# Patient Record
Sex: FEMALE | Race: WHITE | NOT HISPANIC OR LATINO | Employment: FULL TIME | URBAN - METROPOLITAN AREA
[De-identification: names, ages, dates, MRNs, and addresses within clinical notes are randomized per-mention and may not be internally consistent; named-entity substitution may affect disease eponyms.]

---

## 2019-12-02 LAB — PAP SMEAR - HIM PATIENT REPORTED: NEGATIVE

## 2020-01-22 ENCOUNTER — OFFICE VISIT (OUTPATIENT)
Dept: FAMILY MEDICINE | Facility: CLINIC | Age: 40
End: 2020-01-22
Payer: COMMERCIAL

## 2020-01-22 VITALS
DIASTOLIC BLOOD PRESSURE: 65 MMHG | HEART RATE: 76 BPM | WEIGHT: 118.25 LBS | HEIGHT: 67 IN | OXYGEN SATURATION: 98 % | BODY MASS INDEX: 18.56 KG/M2 | SYSTOLIC BLOOD PRESSURE: 105 MMHG | RESPIRATION RATE: 15 BRPM | TEMPERATURE: 98.3 F

## 2020-01-22 DIAGNOSIS — K13.70 MOUTH LESION: Primary | ICD-10-CM

## 2020-01-22 PROBLEM — D18.00 CAVERNOUS HEMANGIOMA: Status: ACTIVE | Noted: 2020-01-14

## 2020-01-22 PROBLEM — E03.9 HYPOTHYROIDISM: Status: ACTIVE | Noted: 2020-01-14

## 2020-01-22 PROBLEM — J30.2 SEASONAL ALLERGIES: Status: ACTIVE | Noted: 2020-01-14

## 2020-01-22 RX ORDER — LEVOTHYROXINE SODIUM 100 UG/1
86 TABLET ORAL DAILY
COMMUNITY
Start: 2019-10-23

## 2020-01-22 RX ORDER — LORAZEPAM 0.5 MG/1
0.5 TABLET ORAL PRN
COMMUNITY
Start: 2019-12-02 | End: 2022-08-19

## 2020-01-22 RX ORDER — AMOXICILLIN 875 MG
875 TABLET ORAL 2 TIMES DAILY
Qty: 14 TABLET | Refills: 0 | Status: SHIPPED | OUTPATIENT
Start: 2020-01-22 | End: 2022-08-19

## 2020-01-22 RX ORDER — CETIRIZINE HYDROCHLORIDE 10 MG/1
10 TABLET ORAL PRN
COMMUNITY
Start: 2019-12-02

## 2020-01-22 SDOH — HEALTH STABILITY: MENTAL HEALTH: HOW MANY STANDARD DRINKS CONTAINING ALCOHOL DO YOU HAVE ON A TYPICAL DAY?: 1 OR 2

## 2020-01-22 SDOH — HEALTH STABILITY: MENTAL HEALTH: HOW OFTEN DO YOU HAVE A DRINK CONTAINING ALCOHOL?: 4 OR MORE TIMES A WEEK

## 2020-01-22 ASSESSMENT — MIFFLIN-ST. JEOR: SCORE: 1256.62

## 2020-01-22 NOTE — PROGRESS NOTES
"SUBJECTIVE:   Lindsay Carranza is a 39 year old female who presents to clinic today to discuss the following problem(s).    Patient is new to [place] and needs to establish with primary care with a wellness visit, but prefers to address only acute issue as noted below    (K13.70) Mouth lesion  (primary encounter diagnosis)  - her cat \"Tasha\" landed on her face and scratched her upper and lower lip on 12/29   - the upper lip is healing well without issues  - the lower lip was not healing well so she went to [facility] for assessment  - they did not see evidence of infection, did not suspect HSV  - they gave her magic mouthwash for pain which she never actually used, preferring to continue using listerine  - since then, patient reports the wound got better, then developed a sort of pustule on the inner lip which then resolved on its own, but then returned again  - reports pustule is very painful to touch  - denies further symptoms as noted in ROS below      ROS:   CONSTITUTIONAL: NEGATIVE for chills, fatigue, fever, sweats  ENT/MOUTH: Mouth lesion as noted above. NEGATIVE for nasal congestion, postnasal drainage and rhinorrhea  RESP: NEGATIVE for cough, SOB/dyspnea and wheezing  CV: NEGATIVE for chest pain/chest pressure, dyspnea on exertion  GI: NEGATIVE for abdominal pain, diarrhea, dysphagia and nausea  MUSCULOSKELETAL: NEGATIVE for arthralgias, cyanosis, or edema  INTEGUMENTARY/SKIN: Persistent intraoral mouth lesion as noted above  HEME/ALLERGY/IMMUNE: Does note singular post auricular swollen lymph node on left  PSYCHIATRIC: NEGATIVE    Today's PHQ-2:  PHQ-2 ( 1999 Pfizer) 1/22/2020   Q1: Little interest or pleasure in doing things 0   Q2: Feeling down, depressed or hopeless 0   PHQ-2 Score 0       Past Medical History:   Diagnosis Date     Cavernous hemangioma of brain (H)      Hypothyroid      History reviewed. No pertinent surgical history.  History reviewed. No pertinent family history.  Social History     Tobacco " "Use     Smoking status: Never Smoker     Smokeless tobacco: Never Used   Substance Use Topics     Alcohol use: Yes     Frequency: 4 or more times a week     Drinks per session: 1 or 2     Drug use: None     Social History     Social History Narrative     Not on file       Current Outpatient Medications   Medication     amoxicillin (AMOXIL) 875 MG tablet     cetirizine (ZYRTEC) 10 MG tablet     levothyroxine (SYNTHROID/LEVOTHROID) 100 MCG tablet     LORazepam (ATIVAN) 0.5 MG tablet     No current facility-administered medications for this visit.      I have reviewed the patient's past medical, surgical, family, and social history.     OBJECTIVE:   /65 (BP Location: Right arm, Patient Position: Sitting, Cuff Size: Adult Regular)   Pulse 76   Temp 98.3  F (36.8  C) (Oral)   Resp 15   Ht 1.706 m (5' 7.17\")   Wt 53.6 kg (118 lb 4 oz)   LMP 12/30/2019   SpO2 98%   BMI 18.43 kg/m      Constitutional: well-appearing, appears stated age  Eyes: conjunctivae without erythema, sclera anicteric.   Mouth: single 3-4mm intraoral pustule on lower lip, tender to palpation  Cardiac: regular rate and rhythm, normal S1/S2, no murmur/rubs/gallops  Respiratory: lungs clear to auscultation bilaterally, normal work of breathing, no wheezes/crackles  Skin: Well healing laceration at upper lip  Psych: affect is full and appropriate, speech is fluent and non-pressured    ASSESSMENT AND PLAN:     Lindsay was seen today for mouth/lip problem.    Diagnoses and all orders for this visit:    Mouth lesion  -     amoxicillin (AMOXIL) 875 MG tablet; Take 1 tablet (875 mg) by mouth 2 times daily    Lesion to upper lip appears to be healing well. Suspect persistent infection at site of intraoral laceration given duration of symptoms. No concern for rabies. Considered possibility of MRSA, but presentation is less that of an abscess and more of a pustule at lesion site. Also, I would think the waxing and waning of symptoms peculiar for an MRSA " infection. However, if symptoms fail to improve with amoxicillin as prescribed, I would be more inclined to cut and drain the pustule for culture/HSV testing.   Discussed development of worsening symptoms such as multiplying pustules or worsening pain with fever or developing nausea as reason to seek more immediate medical follow up.   Will plan for annual wellness exam at future date.      John Vela MD  [facility]  01/22/2020, 9:17 AM

## 2020-01-22 NOTE — LETTER
[employer] Customer Service  [facility]  [address], [address]  [place] [address]  Fax: [phone]  Phone: [phone]      2020      Lindsay Carranza  [address]  [address]  [place] [address]        Dear Lindsay,    Thank you for your interest in becoming a [employer] user!    Your access code is: [email or web address]  Expires: 3/22/2020  8:42 AM     Please access the [employer] website at [email or web address].  Below the ID and password fields, select the  Sign Up Now  as New User.  You will be prompted to enter the access code listed above as well as additional personal information.  Please follow the directions carefully when creating your username and password.    If you allow your access code to , or if you have any questions please call a Heartbeat Representative at [phone] during normal clinic hours.     Sincerely,      [facility] Customer Service  [facility]

## 2020-01-22 NOTE — NURSING NOTE
"39 year old  Chief Complaint   Patient presents with     Mouth/Lip Problem     cat scratched lip 12/29, not healing well        Blood pressure 105/65, pulse 76, temperature 98.3  F (36.8  C), temperature source Oral, resp. rate 15, height 1.706 m (5' 7.17\"), weight 53.6 kg (118 lb 4 oz), last menstrual period 12/30/2019, SpO2 98 %. Body mass index is 18.43 kg/m .  Patient Active Problem List   Diagnosis     Cavernous hemangioma     Hypothyroidism     Seasonal allergies     Abnormal Pap smear of cervix       Wt Readings from Last 2 Encounters:   01/22/20 53.6 kg (118 lb 4 oz)     BP Readings from Last 3 Encounters:   01/22/20 105/65         Current Outpatient Medications   Medication     cetirizine (ZYRTEC) 10 MG tablet     levothyroxine (SYNTHROID/LEVOTHROID) 100 MCG tablet     LORazepam (ATIVAN) 0.5 MG tablet     No current facility-administered medications for this visit.        Social History     Tobacco Use     Smoking status: Never Smoker     Smokeless tobacco: Never Used   Substance Use Topics     Alcohol use: Yes     Frequency: 4 or more times a week     Drinks per session: 1 or 2     Drug use: None       Health Maintenance Due   Topic Date Due     PREVENTIVE CARE VISIT  1982     HIV SCREENING  06/03/1997     PAP  06/03/2003       No results found for: PAP      January 22, 2020 9:00 AM    "

## 2020-03-11 ENCOUNTER — HEALTH MAINTENANCE LETTER (OUTPATIENT)
Age: 40
End: 2020-03-11

## 2020-04-16 ENCOUNTER — VIRTUAL VISIT (OUTPATIENT)
Dept: FAMILY MEDICINE | Facility: CLINIC | Age: 40
End: 2020-04-16
Payer: COMMERCIAL

## 2020-04-16 DIAGNOSIS — G62.9 NEUROPATHY: ICD-10-CM

## 2020-04-16 DIAGNOSIS — D49.6 BRAIN TUMOR (H): ICD-10-CM

## 2020-04-16 DIAGNOSIS — Z09 HOSPITAL DISCHARGE FOLLOW-UP: Primary | ICD-10-CM

## 2020-04-16 RX ORDER — PREGABALIN 25 MG/1
25 CAPSULE ORAL 2 TIMES DAILY
COMMUNITY
End: 2020-04-16

## 2020-04-16 RX ORDER — PREGABALIN 50 MG/1
50 CAPSULE ORAL DAILY
Qty: 30 CAPSULE | Refills: 3 | Status: SHIPPED | OUTPATIENT
Start: 2020-04-16 | End: 2022-08-19

## 2020-04-16 RX ORDER — PREGABALIN 25 MG/1
25 CAPSULE ORAL 2 TIMES DAILY
Qty: 60 CAPSULE | Refills: 3 | Status: SHIPPED | OUTPATIENT
Start: 2020-04-16 | End: 2022-08-19

## 2020-04-16 RX ORDER — PREGABALIN 50 MG/1
50 CAPSULE ORAL DAILY
COMMUNITY
End: 2020-04-16

## 2020-04-16 ASSESSMENT — PAIN SCALES - GENERAL: PAINLEVEL: NO PAIN (0)

## 2020-04-16 NOTE — NURSING NOTE
39 year old  Chief Complaint   Patient presents with     Surgical Followup     discuss about a new medication and nees to file for a disability form.       There were no vitals taken for this visit. There is no height or weight on file to calculate BMI.  Patient Active Problem List   Diagnosis     Cavernous hemangioma     Hypothyroidism     Seasonal allergies     Abnormal Pap smear of cervix       Wt Readings from Last 2 Encounters:   01/22/20 53.6 kg (118 lb 4 oz)     BP Readings from Last 3 Encounters:   01/22/20 105/65         Current Outpatient Medications   Medication     cetirizine (ZYRTEC) 10 MG tablet     levothyroxine (SYNTHROID/LEVOTHROID) 100 MCG tablet     pregabalin (LYRICA) 25 MG capsule     pregabalin (LYRICA) 50 MG capsule     amoxicillin (AMOXIL) 875 MG tablet     LORazepam (ATIVAN) 0.5 MG tablet     No current facility-administered medications for this visit.        Social History     Tobacco Use     Smoking status: Never Smoker     Smokeless tobacco: Never Used   Substance Use Topics     Alcohol use: Yes     Frequency: 4 or more times a week     Drinks per session: 1 or 2     Drug use: None       Health Maintenance Due   Topic Date Due     PREVENTIVE CARE VISIT  1982     HIV SCREENING  06/03/1997     PAP  06/03/2003       No results found for: PAP      Lakshmi Tracy CMA, [facility]  April 16, 2020 9:34 AM

## 2020-04-16 NOTE — PROGRESS NOTES
"Lindsay Carranza is a 39 year old female who is being evaluated via a billable telephone visit.      The patient has been notified of following:     \"This telephone visit will be conducted via a call between you and your physician/provider. We have found that certain health care needs can be provided without the need for a physical exam.  This service lets us provide the care you need with a short phone conversation.  If a prescription is necessary we can send it directly to your pharmacy.  If lab work is needed we can place an order for that and you can then stop by our lab to have the test done at a later time.    Telephone visits are billed at different rates depending on your insurance coverage. During this emergency period, for some insurers they may be billed the same as an in-person visit.  Please reach out to your insurance provider with any questions.    If during the course of the call the physician/provider feels a telephone visit is not appropriate, you will not be charged for this service.\"    Patient has given verbal consent for Telephone visit?  Yes    How would you like to obtain your AVS? MyChart    Subjective     Lindsay Carranza is a 39 year old female who presents to clinic today for the following health issues:    PROBLEMS TO ADD ON...  # Hospital Follow Up  Admission Date: 3/12/2020 Discharge Date: 3/19/2020  [facility], Discharge Date 4/9/20    PRINCIPAL DIAGNOSIS  Cerebral Hemorrhage   Brain Tumor    Active Problems:  Dysphagia Following Nontraumatic Intracerebral Hemorrhage  Esotropia Alternating  Hypothyroidism  Ataxia Cerebellar  Dysarthria    Reviewed discharge summary of 3/12 and 4/9    Patient reports she would like to try to taper off of the Pregabalin she was started on inpatient for neuropathy. She denies issues taking medications. She denies concerning side effects.     No recommended follow up labs noted.    Patient continues with outpatient rehab.    Patient " has plan in place to follow up with neurosurgery in two months for repeat imaging and assessment of intracranial lesions.    Disability   Patient is trying to address disability insurance. Insurance is requesting progress notes from treatment with information clarifying work limitations. We don't have any of this as I have not seen patient since recent trauma. Patient was in inpatient rehab for three weeks following hospital discharge, at the [facility]. I can see discharge records from them in Epic. Patient and her partner are wondering how best to get pertinent requested information to the insurance company.     Patient Active Problem List   Diagnosis     Cavernous hemangioma     Hypothyroidism     Seasonal allergies     Abnormal Pap smear of cervix     History reviewed. No pertinent surgical history.    Social History     Tobacco Use     Smoking status: Never Smoker     Smokeless tobacco: Never Used   Substance Use Topics     Alcohol use: Yes     Frequency: 4 or more times a week     Drinks per session: 1 or 2     History reviewed. No pertinent family history.      Current Outpatient Medications   Medication Sig Dispense Refill     cetirizine (ZYRTEC) 10 MG tablet Take 10 mg by mouth as needed       levothyroxine (SYNTHROID/LEVOTHROID) 100 MCG tablet Take 86 mcg by mouth daily Take 100mcg six of every seven days. This averages to 86mcg daily.        pregabalin (LYRICA) 25 MG capsule Take 1 capsule (25 mg) by mouth 2 times daily 60 capsule 3     pregabalin (LYRICA) 50 MG capsule Take 1 capsule (50 mg) by mouth daily 30 capsule 3     amoxicillin (AMOXIL) 875 MG tablet Take 1 tablet (875 mg) by mouth 2 times daily (Patient not taking: Reported on 4/16/2020) 14 tablet 0     LORazepam (ATIVAN) 0.5 MG tablet Take 0.5 mg by mouth as needed       Allergies   Allergen Reactions     Cephalexin Hives     No Clinical Screening - See Comments      Blood thinners due to bleed risk          Reviewed and updated as needed this  visit by Provider  Allergies  Meds  Problems  Med Hx  Surg Hx         Review of Systems   ROS COMP: CONSTITUTIONAL: NEGATIVE for fever, chills, change in weight  EYES: Persistent esotropia and diplopia  ENT/MOUTH: Dysphagia  RESP: NEGATIVE for significant cough or SOB  CV: NEGATIVE for chest pain, palpitations or peripheral edema  GI: NEGATIVE for nausea, abdominal pain, heartburn, or change in bowel habits  : NEGATIVE for frequency, dysuria, or hematuria  MUSCULOSKELETAL: Ataxia, ataxic dysarthria  NEURO: Peripheral neuropathy   HEME: NEGATIVE for bleeding problems  PSYCHIATRIC: NEGATIVE for changes in mood or affect    Objective   Reported vitals:  There were no vitals taken for this visit.   Neuro: speech is audibly slurred but wholly intelligible  PSYCH: Alert; coherent speech, normal   rate and volume, able to articulate logical thoughts, able   to abstract reason, no tangential thoughts, no hallucinations   or delusions  Her affect is normal and pleasant  RESP: No cough, no audible wheezing, able to talk in full sentences  Remainder of exam unable to be completed due to telephone visits      Assessment/Plan:  Lindsay was seen today for surgical followup.    Diagnoses and all orders for this visit:    Hospital discharge follow-up    Neuropathy  -     pregabalin (LYRICA) 25 MG capsule; Take 1 capsule (25 mg) by mouth 2 times daily  -     pregabalin (LYRICA) 50 MG capsule; Take 1 capsule (50 mg) by mouth daily    Brain tumor (H)    Reviewed discharge summaries and recommendations. Discussed ongoing use of pregabalin. I will refill full Rx today although patient reports she is highly motivated to taper off this medication. If she does, that would be fine, but if she continues to need this medication I am comfortable continuing the medication. Will continue to assist with physical and occupational therapy and recovery as needed.     Having not seen the patient since prior to the brain hemorrhage, I cannot speak  to her disability personally. I have reviewed notes from [facility]. I have advised patient and her partner to contact the [facility] at [place] to see if they can fax requested information to her disability insurance company as this seems the easiest way to proceed. They plan to to this. I will also route this note to JUANITO Guzmán for her information as she may have insight into how best to facilitate patient's insurance claim.     Phone call duration:  24 minutes    John Vela MD  11:28 AM, April 16, 2020

## 2021-01-04 ENCOUNTER — HEALTH MAINTENANCE LETTER (OUTPATIENT)
Age: 41
End: 2021-01-04

## 2021-04-25 ENCOUNTER — HEALTH MAINTENANCE LETTER (OUTPATIENT)
Age: 41
End: 2021-04-25

## 2021-10-10 ENCOUNTER — HEALTH MAINTENANCE LETTER (OUTPATIENT)
Age: 41
End: 2021-10-10

## 2021-11-01 ENCOUNTER — NURSE TRIAGE (OUTPATIENT)
Dept: FAMILY MEDICINE | Facility: CLINIC | Age: 41
End: 2021-11-01

## 2021-11-01 NOTE — TELEPHONE ENCOUNTER
Who is calling? Ramez  Reason for Call:Patient is covid positive - spouse wants call back to know what to do if symptoms worsen

## 2021-11-01 NOTE — TELEPHONE ENCOUNTER
Reason for Disposition    [1] COVID-19 diagnosed by positive lab test AND [2] mild symptoms (e.g., cough, fever, others) AND [3] no complications or SOB    Answer Assessment - Initial Assessment Questions  1. COVID-19 DIAGNOSIS: 11/1/21 @  Labs in [place]  2. COVID-19 EXPOSURE: None known.  3. ONSET: 10/31/21  4. WORST SYMPTOM: Body aches, worse in low back.  5. COUGH: Yes, periodically.  6. FEVER: As high as 101, today is 100.  7. RESPIRATORY STATUS: No, shortness of breath, wheezing or chest pressure.  8. BETTER-SAME-WORSE: Getting worse.  9. HIGH RISK DISEASE: Ataxia, Hypothyroidism.  10. PREGNANCY: No  11. OTHER SYMPTOMS: Mild sore throat, body aches, fatigue, chills on and off and headache.    Protocols used: CORONAVIRUS (COVID-19) DIAGNOSED OR TDLTANIXX-O-BU 8.25.2021    Patient tested positive for COVID 19 this morning.  Symptoms are mild and her worst symptom is the body aches.  Her  has tested negative and reviewed quarantine and precautions in the home.  Patient is in agreement and will call if symptoms worsen.  Irish Hsieh RN, BSN  [facility]  11/01/21  10:17 AM

## 2022-05-22 ENCOUNTER — HEALTH MAINTENANCE LETTER (OUTPATIENT)
Age: 42
End: 2022-05-22

## 2022-06-28 ENCOUNTER — OFFICE VISIT (OUTPATIENT)
Dept: FAMILY MEDICINE | Facility: CLINIC | Age: 42
End: 2022-06-28
Payer: COMMERCIAL

## 2022-06-28 VITALS
SYSTOLIC BLOOD PRESSURE: 107 MMHG | TEMPERATURE: 98.3 F | RESPIRATION RATE: 15 BRPM | HEART RATE: 73 BPM | BODY MASS INDEX: 18.86 KG/M2 | OXYGEN SATURATION: 95 % | WEIGHT: 121 LBS | DIASTOLIC BLOOD PRESSURE: 66 MMHG

## 2022-06-28 DIAGNOSIS — S76.111A QUADRICEPS STRAIN, RIGHT, INITIAL ENCOUNTER: Primary | ICD-10-CM

## 2022-06-28 NOTE — PROGRESS NOTES
Medical assistant intake:  Lindsay Carranza is a 42 year old female who presents to [facility] today for Musculoskeletal Problem (Right quad injury 3 weeks ago, not getting better)        ASSESSMENT/PLAN:  RIGHT distal quad strain in a 41 yo with baseline problems with balance and gait. Reassuring physical exam with no worsening with weight bearing, hip and knee extension and flexion. No palpable defects. No redness or warmth. No effusions.   1. Suggested physical therapy at your normal PT location at [facility]  2. OK to continue your baseline activities  3. Let me or Dr. Vela know if unable to progress       --Antonio William MD      SUBJECTIVE:   This is my first time meeting Lindsay.  I have seen her , Ramez in the past.    Lindsay is a 41 yo who has difficulty ambulating due to a CNS bleed a few years ago. She is here due to RIGHT distal quad pain for the past 3 weeks. She noticed it after rising from a squatting position during her exercises.   Pain is with walking and more so in the mornings.     Review Of Systems:    Has otherwise been in usual state of health, e.g.   Cardiovascular: negative  Respiratory: No shortness of breath, dyspnea on exertion, cough, or hemoptysis  Gastrointestinal: negative  Genitourinary: negative    Problem list per EMR:  Patient Active Problem List   Diagnosis     Cavernous hemangioma     Hypothyroidism     Seasonal allergies     Abnormal Pap smear of cervix       Current Outpatient Medications   Medication Sig Dispense Refill     cetirizine (ZYRTEC) 10 MG tablet Take 10 mg by mouth as needed       levothyroxine (SYNTHROID/LEVOTHROID) 100 MCG tablet Take 86 mcg by mouth daily Take 100mcg six of every seven days. This averages to 86mcg daily.        LORazepam (ATIVAN) 0.5 MG tablet Take 0.5 mg by mouth as needed       pregabalin (LYRICA) 25 MG capsule Take 1 capsule (25 mg) by mouth 2 times daily 60 capsule 3     pregabalin (LYRICA) 50 MG capsule Take 1 capsule (50  mg) by mouth daily 30 capsule 3     amoxicillin (AMOXIL) 875 MG tablet Take 1 tablet (875 mg) by mouth 2 times daily (Patient not taking: No sig reported) 14 tablet 0       Allergies   Allergen Reactions     Cephalexin Hives     Other [No Clinical Screening - See Comments]      Blood thinners due to bleed risk           Social:   Works for [employer] as an .      OBJECTIVE    Vitals: /66 (BP Location: Right arm, Patient Position: Sitting, Cuff Size: Adult Regular)   Pulse 73   Temp 98.3  F (36.8  C) (Skin)   Resp 15   Wt 54.9 kg (121 lb)   LMP 06/22/2022   SpO2 95%   BMI 18.86 kg/m    BMI= Body mass index is 18.86 kg/m .  Very pleasant 42-year-old who has a very slightly slurred speech due to her previous brain injury.  She is completely understandable and articulate.  She ambulates with a walker which she has for the past few years.  She is seen to stand fully on her right leg without any difficulty.  Her gait is somewhat antalgic but again this is chronic.  Her right leg is actually stronger than her left leg.    Right leg exam.  Discomfort is at the distal quad tendon as it inserts onto the patella.  She can do an active straight leg raise without any extension lag.  There are no palpable defects.  No masses.  No redness.  No warmth.  She is also able to flex her knee fully.  She can hold a straight leg raise without difficulty.    Her knee has a normal ligamentous exam.  No joint line tenderness.  Patellar mobility is intact and without apprehension.    Hip has full range of motion without discomfort.  Negative straight leg raise.    SEE TOP OF NOTE FOR ASSESSMENT AND PLAN    --Antonio William MD  [facility], Department of Family Medicine and Community Health

## 2022-06-28 NOTE — PATIENT INSTRUCTIONS
ASSESSMENT/PLAN:  RIGHT distal quad strain in a 43 yo with baseline problems with balance and gait. Reassuring physical exam with no worsening with weight bearing, hip and knee extension and flexion. No palpable defects. No redness or warmth. No effusions.   Suggested physical therapy at your normal PT location at [facility] to continue your baseline activities  Let me or Dr. Vela know if unable to progress       --Antonio William MD

## 2022-06-28 NOTE — NURSING NOTE
42 year old  Chief Complaint   Patient presents with     Musculoskeletal Problem     Right quad injury 3 weeks ago, not getting better       Blood pressure 107/66, pulse 73, temperature 98.3  F (36.8  C), temperature source Skin, resp. rate 15, weight 54.9 kg (121 lb), last menstrual period 06/22/2022, SpO2 95 %. Body mass index is 18.86 kg/m .  Patient Active Problem List   Diagnosis     Cavernous hemangioma     Hypothyroidism     Seasonal allergies     Abnormal Pap smear of cervix       Wt Readings from Last 2 Encounters:   06/28/22 54.9 kg (121 lb)   01/22/20 53.6 kg (118 lb 4 oz)     BP Readings from Last 3 Encounters:   06/28/22 107/66   01/22/20 105/65         Current Outpatient Medications   Medication     cetirizine (ZYRTEC) 10 MG tablet     levothyroxine (SYNTHROID/LEVOTHROID) 100 MCG tablet     LORazepam (ATIVAN) 0.5 MG tablet     pregabalin (LYRICA) 25 MG capsule     pregabalin (LYRICA) 50 MG capsule     amoxicillin (AMOXIL) 875 MG tablet     No current facility-administered medications for this visit.       Social History     Tobacco Use     Smoking status: Never Smoker     Smokeless tobacco: Never Used   Substance Use Topics     Alcohol use: Yes       Health Maintenance Due   Topic Date Due     PREVENTIVE CARE VISIT  Never done     ADVANCE CARE PLANNING  Never done     HIV SCREENING  Never done     HEPATITIS C SCREENING  Never done     PAP  12/02/2022       No results found for: PAP      June 28, 2022 7:54 AM

## 2022-08-03 ENCOUNTER — DOCUMENTATION ONLY (OUTPATIENT)
Dept: BEHAVIORAL HEALTH | Facility: CLINIC | Age: 42
End: 2022-08-03

## 2022-08-03 DIAGNOSIS — F43.23 ADJUSTMENT DISORDER WITH MIXED ANXIETY AND DEPRESSED MOOD: Primary | ICD-10-CM

## 2022-08-03 NOTE — PROGRESS NOTES
[facility] rec'd request for psychotherapy services for patient.  Due to patient's complex medical conditions, [facility] referred patient to Health Psychology for services.  [facility] submitted referral.

## 2022-08-19 ENCOUNTER — VIRTUAL VISIT (OUTPATIENT)
Dept: FAMILY MEDICINE | Facility: CLINIC | Age: 42
End: 2022-08-19
Payer: COMMERCIAL

## 2022-08-19 DIAGNOSIS — D18.00 CAVERNOUS HEMANGIOMA: ICD-10-CM

## 2022-08-19 DIAGNOSIS — F41.9 ANXIETY: Primary | ICD-10-CM

## 2022-08-19 PROBLEM — R47.1 DYSARTHRIA: Status: ACTIVE | Noted: 2022-08-19

## 2022-08-19 PROBLEM — H55.00 NYSTAGMUS: Status: ACTIVE | Noted: 2022-08-19

## 2022-08-19 PROBLEM — Z87.440 HISTORY OF RECURRENT UTI (URINARY TRACT INFECTION): Status: ACTIVE | Noted: 2022-06-11

## 2022-08-19 PROBLEM — R27.0 ATAXIA: Status: ACTIVE | Noted: 2022-06-09

## 2022-08-19 PROBLEM — H50.00 ESOTROPIA: Status: ACTIVE | Noted: 2020-03-27

## 2022-08-19 PROBLEM — I69.191 DYSPHAGIA FOLLOWING NONTRAUMATIC INTRACEREBRAL HEMORRHAGE: Status: ACTIVE | Noted: 2020-03-17

## 2022-08-19 PROBLEM — G81.00 FLACCID HEMIPLEGIA AFFECTING NONDOMINANT SIDE (H): Status: ACTIVE | Noted: 2022-06-09

## 2022-08-19 PROBLEM — H53.2 DIPLOPIA: Status: ACTIVE | Noted: 2018-12-28

## 2022-08-19 PROBLEM — Q28.3 CONGENITAL ANOMALY OF CEREBROVASCULAR SYSTEM: Status: ACTIVE | Noted: 2017-08-10

## 2022-08-19 PROBLEM — R44.9 LEFT-SIDED SENSORY DEFICIT PRESENT: Status: ACTIVE | Noted: 2020-03-07

## 2022-08-19 PROBLEM — I61.3: Status: ACTIVE | Noted: 2020-03-06

## 2022-08-19 PROBLEM — J45.909 ASTHMA: Status: ACTIVE | Noted: 2018-12-28

## 2022-08-19 RX ORDER — MEMANTINE HYDROCHLORIDE 7 MG/1
7 CAPSULE, EXTENDED RELEASE ORAL DAILY
COMMUNITY
Start: 2022-06-01

## 2022-08-19 RX ORDER — CITALOPRAM HYDROBROMIDE 10 MG/1
10 TABLET ORAL DAILY
Qty: 30 TABLET | Refills: 1 | Status: SHIPPED | OUTPATIENT
Start: 2022-08-19 | End: 2022-09-19

## 2022-08-19 ASSESSMENT — ANXIETY QUESTIONNAIRES
GAD7 TOTAL SCORE: 6
3. WORRYING TOO MUCH ABOUT DIFFERENT THINGS: SEVERAL DAYS
IF YOU CHECKED OFF ANY PROBLEMS ON THIS QUESTIONNAIRE, HOW DIFFICULT HAVE THESE PROBLEMS MADE IT FOR YOU TO DO YOUR WORK, TAKE CARE OF THINGS AT HOME, OR GET ALONG WITH OTHER PEOPLE: SOMEWHAT DIFFICULT
1. FEELING NERVOUS, ANXIOUS, OR ON EDGE: MORE THAN HALF THE DAYS
GAD7 TOTAL SCORE: 6
2. NOT BEING ABLE TO STOP OR CONTROL WORRYING: SEVERAL DAYS
7. FEELING AFRAID AS IF SOMETHING AWFUL MIGHT HAPPEN: NOT AT ALL
5. BEING SO RESTLESS THAT IT IS HARD TO SIT STILL: NOT AT ALL
4. TROUBLE RELAXING: SEVERAL DAYS
GAD7 TOTAL SCORE: 6
6. BECOMING EASILY ANNOYED OR IRRITABLE: SEVERAL DAYS
8. IF YOU CHECKED OFF ANY PROBLEMS, HOW DIFFICULT HAVE THESE MADE IT FOR YOU TO DO YOUR WORK, TAKE CARE OF THINGS AT HOME, OR GET ALONG WITH OTHER PEOPLE?: SOMEWHAT DIFFICULT
7. FEELING AFRAID AS IF SOMETHING AWFUL MIGHT HAPPEN: NOT AT ALL

## 2022-08-19 ASSESSMENT — PATIENT HEALTH QUESTIONNAIRE - PHQ9
SUM OF ALL RESPONSES TO PHQ QUESTIONS 1-9: 8
SUM OF ALL RESPONSES TO PHQ QUESTIONS 1-9: 8
10. IF YOU CHECKED OFF ANY PROBLEMS, HOW DIFFICULT HAVE THESE PROBLEMS MADE IT FOR YOU TO DO YOUR WORK, TAKE CARE OF THINGS AT HOME, OR GET ALONG WITH OTHER PEOPLE: SOMEWHAT DIFFICULT

## 2022-08-19 NOTE — PROGRESS NOTES
Lindsay is a 42 year old who is being evaluated via a billable video visit.      How would you like to obtain your AVS? MyChart  If the video visit is dropped, the invitation should be resent by: Send to e-mail at: [email or web address]  Will anyone else be joining your video visit? No          Assessment & Plan   Problem List Items Addressed This Visit        Other    Cavernous hemangioma      Other Visit Diagnoses     Anxiety    -  Primary         Reviewed process for starting medication with monitoring for safety and efficacy. Answered questions regarding drug interactions and possible side effects with medications.     25 minutes spent on the date of the encounter doing chart review, history and exam, documentation and further activities as noted.    John Vela MD  [facility]    Subjective   Lindsay is a 42 year old presenting for the following health issues:  Anxiety (Discuss starting SSRI)      HPI   Anxiety  - see recent MyChart correspondence  My neurologist, with whom I've just had my annual exam, recommended I talk to you about prescribing an SSRI (starting at a low dose). I'm having some trouble with anxiety, which she says is common with cavernomas in the region mine is. I do have an appointment to see a therapist, but not until November.  Overall, things are going well. I'd really like to get my anxiety under control though. As a bonus, [place] compared the rate of cavernoma bleeds for folks taking SSRIs versus not and found that those on SSRIs had lower probabilities of bleeding.  Today  - patient has multiple questions regarding safety of medication, drug interactions, plan for follow up      Review of Systems   Constitutional, HEENT, cardiovascular, pulmonary, gi and gu systems are negative, except as otherwise noted.      Objective           Vitals:  No vitals were obtained today due to virtual visit.    Physical Exam   GENERAL: Healthy, alert and no distress  EYES: Eyes grossly normal to inspection.   No discharge or erythema, or obvious scleral/conjunctival abnormalities.  RESP: No audible wheeze, cough, or visible cyanosis.  No visible retractions or increased work of breathing.    SKIN: Visible skin clear. No significant rash, abnormal pigmentation or lesions.  NEURO: Cranial nerves grossly intact.  Mentation and speech appropriate for age.  PSYCH: Mentation appears normal, affect normal/bright, judgement and insight intact, normal speech and appearance well-groomed.        Video-Visit Details    Video Start Time: 4:16 PM    Type of service:  Video Visit    Video End Time:4:30 PM    Originating Location (pt. Location): Home    Distant Location (provider location):  [facility]     Platform used for Video Visit: [employer]  ..  Answers for HPI/ROS submitted by the patient on 8/19/2022  If you checked off any problems, how difficult have these problems made it for you to do your work, take care of things at home, or get along with other people?: Somewhat difficult  PHQ9 TOTAL SCORE: 8  BRIANNE 7 TOTAL SCORE: 6

## 2022-08-19 NOTE — NURSING NOTE
42 year old  Chief Complaint   Patient presents with     Anxiety     Discuss starting SSRI       Last menstrual period 06/22/2022. There is no height or weight on file to calculate BMI.  Patient Active Problem List   Diagnosis     Cavernous hemangioma     Hypothyroidism     Seasonal allergies     Abnormal Pap smear of cervix       Wt Readings from Last 2 Encounters:   06/28/22 54.9 kg (121 lb)   01/22/20 53.6 kg (118 lb 4 oz)     BP Readings from Last 3 Encounters:   06/28/22 107/66   01/22/20 105/65         Current Outpatient Medications   Medication     cetirizine (ZYRTEC) 10 MG tablet     levothyroxine (SYNTHROID/LEVOTHROID) 100 MCG tablet     memantine XR (NAMENDA XR) 7 MG 24 hr capsule     No current facility-administered medications for this visit.       Social History     Tobacco Use     Smoking status: Never Smoker     Smokeless tobacco: Never Used   Substance Use Topics     Alcohol use: Yes       Health Maintenance Due   Topic Date Due     PREVENTIVE CARE VISIT  Never done     ADVANCE CARE PLANNING  Never done     HIV SCREENING  Never done     HEPATITIS C SCREENING  Never done     PAP  12/02/2022       No results found for: PAP      August 19, 2022 4:15 PM

## 2022-09-16 NOTE — PROGRESS NOTES
Lindsay is a 42 year old who is being evaluated via a billable video visit.      How would you like to obtain your AVS? MyChart  If the video visit is dropped, the invitation should be resent by: Text to cell phone: [phone]  Will anyone else be joining your video visit? No      Assessment & Plan   Problem List Items Addressed This Visit    None     Visit Diagnoses     Anxiety        Relevant Medications    citalopram (CELEXA) 10 MG tablet         Lindsay appears to be responding well to the Celexa. Will continue with medication with one year refill.     21 minutes spent on the date of the encounter doing chart review, history and exam, documentation and further activities as noted.    John Vela MD  [facility]    Subjective   Lindsay is a 42 year old presenting for the following health issues:  Recheck Medication (Celexa Follow up, has had side affects for 3 days. Then has felt normal. Has been feeling like herself. )      HPI   Anxiety follow up  - started on citalopram 10mg last month; this was suggested by her neurologist to address sequela from recent cerebrovascular accident  - due for follow up assessment today  - Lindsay reports the previous pseudobulbar effects which could cause her to start crying spontaneously have all resolved since she she started taking the Celexa  - she reports feeling much more like her old self  - she's not entirely sure if this is because of the medication or because she is healing, but she would like to continue this medication for the time being.   - she denies any concerning side effects    Review of Systems   Constitutional, HEENT, cardiovascular, pulmonary, gi and gu systems are negative, except as otherwise noted.      Objective           Vitals:  No vitals were obtained today due to virtual visit.    Physical Exam   GENERAL: Healthy, alert and no distress  EYES: Eyes grossly normal to inspection.  No discharge or erythema, or obvious scleral/conjunctival  abnormalities.  RESP: No audible wheeze, cough, or visible cyanosis.  No visible retractions or increased work of breathing.    SKIN: Visible skin clear. No significant rash, abnormal pigmentation or lesions.  NEURO: Cranial nerves grossly intact.  Mentation and speech appropriate for age.  PSYCH: Mentation appears normal, affect normal/bright, judgement and insight intact, normal speech and appearance well-groomed.        Video-Visit Details    Video Start Time: 10:56 AM    Type of service:  Video Visit    Video End Time:11:05 AM    Originating Location (pt. Location): Home    Distant Location (provider location):  [facility]     Platform used for Video Visit: [employer]    Answers for HPI/ROS submitted by the patient on 9/19/2022  If you checked off any problems, how difficult have these problems made it for you to do your work, take care of things at home, or get along with other people?: Somewhat difficult  PHQ9 TOTAL SCORE: 4  BRIANNE 7 TOTAL SCORE: 0

## 2022-09-18 ENCOUNTER — HEALTH MAINTENANCE LETTER (OUTPATIENT)
Age: 42
End: 2022-09-18

## 2022-09-19 ENCOUNTER — VIRTUAL VISIT (OUTPATIENT)
Dept: FAMILY MEDICINE | Facility: CLINIC | Age: 42
End: 2022-09-19
Payer: COMMERCIAL

## 2022-09-19 DIAGNOSIS — F41.9 ANXIETY: ICD-10-CM

## 2022-09-19 RX ORDER — CITALOPRAM HYDROBROMIDE 10 MG/1
10 TABLET ORAL DAILY
Qty: 90 TABLET | Refills: 3 | Status: SHIPPED | OUTPATIENT
Start: 2022-09-19 | End: 2023-10-12

## 2022-09-19 ASSESSMENT — ANXIETY QUESTIONNAIRES
GAD7 TOTAL SCORE: 0
2. NOT BEING ABLE TO STOP OR CONTROL WORRYING: NOT AT ALL
GAD7 TOTAL SCORE: 0
GAD7 TOTAL SCORE: 0
7. FEELING AFRAID AS IF SOMETHING AWFUL MIGHT HAPPEN: NOT AT ALL
7. FEELING AFRAID AS IF SOMETHING AWFUL MIGHT HAPPEN: NOT AT ALL
1. FEELING NERVOUS, ANXIOUS, OR ON EDGE: NOT AT ALL
3. WORRYING TOO MUCH ABOUT DIFFERENT THINGS: NOT AT ALL
6. BECOMING EASILY ANNOYED OR IRRITABLE: NOT AT ALL
4. TROUBLE RELAXING: NOT AT ALL
5. BEING SO RESTLESS THAT IT IS HARD TO SIT STILL: NOT AT ALL

## 2022-09-19 ASSESSMENT — PATIENT HEALTH QUESTIONNAIRE - PHQ9
SUM OF ALL RESPONSES TO PHQ QUESTIONS 1-9: 4
10. IF YOU CHECKED OFF ANY PROBLEMS, HOW DIFFICULT HAVE THESE PROBLEMS MADE IT FOR YOU TO DO YOUR WORK, TAKE CARE OF THINGS AT HOME, OR GET ALONG WITH OTHER PEOPLE: SOMEWHAT DIFFICULT
SUM OF ALL RESPONSES TO PHQ QUESTIONS 1-9: 4

## 2022-09-19 NOTE — NURSING NOTE
42 year old  Chief Complaint   Patient presents with     Recheck Medication     Celexa Follow up, has had side affects for 3 days. Then has felt normal. Has been feeling like herself.        There were no vitals taken for this visit. There is no height or weight on file to calculate BMI.  Patient Active Problem List   Diagnosis     Cavernous hemangioma     Hypothyroidism     Seasonal allergies     Abnormal Pap smear of cervix     Asthma     Brainstem hemorrhage (H)     Congenital anomaly of cerebrovascular system     Diplopia     Dysarthria     Dysphagia following nontraumatic intracerebral hemorrhage     Esotropia     Flaccid hemiplegia affecting nondominant side (H)     History of recurrent UTI (urinary tract infection)     HPV (human papilloma virus) infection     Ataxia     Left-sided sensory deficit present     Nystagmus       Wt Readings from Last 2 Encounters:   06/28/22 54.9 kg (121 lb)   01/22/20 53.6 kg (118 lb 4 oz)     BP Readings from Last 3 Encounters:   06/28/22 107/66   01/22/20 105/65         Current Outpatient Medications   Medication     cetirizine (ZYRTEC) 10 MG tablet     citalopram (CELEXA) 10 MG tablet     levothyroxine (SYNTHROID/LEVOTHROID) 100 MCG tablet     memantine XR (NAMENDA XR) 7 MG 24 hr capsule     No current facility-administered medications for this visit.       Social History     Tobacco Use     Smoking status: Never Smoker     Smokeless tobacco: Never Used   Substance Use Topics     Alcohol use: Yes       Health Maintenance Due   Topic Date Due     PREVENTIVE CARE VISIT  Never done     ASTHMA ACTION PLAN  Never done     ASTHMA CONTROL TEST  Never done     ADVANCE CARE PLANNING  Never done     HIV SCREENING  Never done     HEPATITIS C SCREENING  Never done     COVID-19 Vaccine (4 - Booster for Moderna series) 02/17/2022     INFLUENZA VACCINE (1) 09/01/2022     PAP  12/02/2022       No results found for: PAP      September 19, 2022 10:53 AM

## 2022-11-19 ASSESSMENT — ANXIETY QUESTIONNAIRES
7. FEELING AFRAID AS IF SOMETHING AWFUL MIGHT HAPPEN: NOT AT ALL
2. NOT BEING ABLE TO STOP OR CONTROL WORRYING: SEVERAL DAYS
5. BEING SO RESTLESS THAT IT IS HARD TO SIT STILL: NOT AT ALL
IF YOU CHECKED OFF ANY PROBLEMS ON THIS QUESTIONNAIRE, HOW DIFFICULT HAVE THESE PROBLEMS MADE IT FOR YOU TO DO YOUR WORK, TAKE CARE OF THINGS AT HOME, OR GET ALONG WITH OTHER PEOPLE: SOMEWHAT DIFFICULT
GAD7 TOTAL SCORE: 2
7. FEELING AFRAID AS IF SOMETHING AWFUL MIGHT HAPPEN: NOT AT ALL
1. FEELING NERVOUS, ANXIOUS, OR ON EDGE: NOT AT ALL
GAD7 TOTAL SCORE: 2
4. TROUBLE RELAXING: SEVERAL DAYS
6. BECOMING EASILY ANNOYED OR IRRITABLE: NOT AT ALL
3. WORRYING TOO MUCH ABOUT DIFFERENT THINGS: NOT AT ALL
GAD7 TOTAL SCORE: 2
8. IF YOU CHECKED OFF ANY PROBLEMS, HOW DIFFICULT HAVE THESE MADE IT FOR YOU TO DO YOUR WORK, TAKE CARE OF THINGS AT HOME, OR GET ALONG WITH OTHER PEOPLE?: SOMEWHAT DIFFICULT

## 2022-11-19 ASSESSMENT — PATIENT HEALTH QUESTIONNAIRE - PHQ9
SUM OF ALL RESPONSES TO PHQ QUESTIONS 1-9: 7
10. IF YOU CHECKED OFF ANY PROBLEMS, HOW DIFFICULT HAVE THESE PROBLEMS MADE IT FOR YOU TO DO YOUR WORK, TAKE CARE OF THINGS AT HOME, OR GET ALONG WITH OTHER PEOPLE: SOMEWHAT DIFFICULT
SUM OF ALL RESPONSES TO PHQ QUESTIONS 1-9: 7

## 2022-11-21 ENCOUNTER — VIRTUAL VISIT (OUTPATIENT)
Dept: PSYCHOLOGY | Facility: CLINIC | Age: 42
End: 2022-11-21
Payer: COMMERCIAL

## 2022-11-21 DIAGNOSIS — F43.23 ADJUSTMENT DISORDER WITH MIXED ANXIETY AND DEPRESSED MOOD: Primary | ICD-10-CM

## 2022-11-21 PROCEDURE — 90791 PSYCH DIAGNOSTIC EVALUATION: CPT | Mod: 95 | Performed by: PSYCHOLOGIST

## 2022-11-21 NOTE — PROGRESS NOTES
Health Psychology                     Department of Medicine  Yari Avila, Ph.D., L.P. [phone]                          [facility] Stacey Nguyễn, Ph.D., L.P. [phone]                   [place] Mail Code 538   RubyVan, Ph.D., L.P. [phone]       [address] [place] [address] Kathi Thompson, Ph.D., A.B.P.P., L.P. [phone]         [place] [address]          Larry Yates, Ph.D., A.B.P.P., L.P. [phone]     Charis Ruiz, Ph.D., L.P. [phone]  [facility]   Anna Das, Ph.D., A.B.P.P., L.P. [phone]  [address] [place] [address]        Confidential Summary of Health Psychology Telemental Health Evaluation*    Referral Source:  Shawn Ullrich, LICSW,  John Vela M.D.    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  Since then, she has been anxious, fearful of having another bleed, and having some difficulty coping with the residual effects of the last bleed.    History of Presenting Concerns: Dr. Carranza first experienced a brain bleed approximately 7 years ago .  At that time, she was seen at [facility].  She had paresthesias in her left side and left hand afterwards, but no other effects.  At the beginning of the pandemic, she had a more extensive bleed of a cavernoma's hemangioma, requiring surgery at the [facility], with a 5-week hospitalization, including two weeks in the ICU.  She then had follow-up outpatient occupational therapy, physical therapy and speech therapy.  She reports the main symptoms are fatigue, pain in the left side of her face and fingers varying between a 0 and 7 on a 10 point subjective scale.  She also has left-sided weakness, needs to ambulate with either a  walker or power chair, and is slowed down.  She is frustrated that she is not able to do as much, feels guilty that her  is called upon to handle more activities at home.  She is mildly dysarthric, is now typing slowly.  She has noticed that her concentration is decreased, especially when fatigued.  She otherwise does not feel that there have been changes in her cognition or personality, though stated that she cries more easily due to pseudobulbar affect.    She reports that she is now anxious about having yet another bleed.  Since June, 2022 she has had intermittent tingling on her right side.  It is similar to how it felt when she had her previous bleeds.  She states that she is anxious about doing things and coping with the uncertainty about her future.   She acknowledges some guilt about the impact of her neurological changes on her marriage and dislikes feeling dependent, or close to helpless about doing some things.    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  She does not use alcohol or illicit drugs.  She acknowledges drinking 3 mugs of coffee per day.  She has a pescatarian diet.  She is not currently getting OT, PT or speech therapy.  Exercise includes recumbent bike, Pilates machine, yoga and light free weights.  Her neurological care is through the [facility].  Her primary care is at the [facility].  Her rehabilitation therapies have been at [facility].    Past Medical History:   Diagnosis Date     Cavernous hemangioma of brain (H)      Hypothyroid      No past surgical history on file.  Current Outpatient Medications   Medication     cetirizine (ZYRTEC) 10 MG tablet     citalopram (CELEXA) 10 MG tablet     HEMP OIL OR EXTRACT OR OTHER CBD CANNABINOID, NOT MEDICAL CANNABIS,     levothyroxine (SYNTHROID/LEVOTHROID) 100 MCG tablet     memantine XR (NAMENDA XR) 7 MG 24 hr capsule     No current facility-administered medications for this visit.     Wt Readings from Last 4 Encounters:  "  06/28/22 54.9 kg (121 lb)   01/22/20 53.6 kg (118 lb 4 oz)     Estimated body mass index is 18.86 kg/m  as calculated from the following:    Height as of 1/22/20: 1.706 m (5' 7.17\").    Weight as of 6/28/22: 54.9 kg (121 lb).    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  Her parents are alive and well, recently retired from a business selling parts for string instruments.  She has a younger sister with OCD, who now lives in [place] with her  and 5-week old nephew.  She reports good relationships with all family members.    She studied English at [employer] where  she received her bachelor's degree and then in 2017 obtained a PhD in literature from [employer]  She worked for the [employer] for a few years and then the [employer] since 2019 where she is a humanities .  She had worked for a [employer] company in [place] dealing with finances in between her degrees.  She is currently working full time, going in to the office one afternoon per week.  She recently attended two conferences a week apart and feels it took 10 days to recover.    She has been  for 12 years to her  Ramez.  He works for an [employer], [email or web address].  She played the piano, studying for 7 years.   Since her second bleed has tried to return to playing a keyboard as a form of therapy.  She enjoys playing games such as canEducation Elementsa, cribbage, backgammen with her  and friends.  She is not driving due to the visual problems.    Psychiatric History: Dr. Carranza obtained counseling during college and briefly thereafter on a group and individual basis to address some social anxiety and her existential dread.  She did not feel like she was actually feeling depressed at that time.  There is no history of psychiatric hospitalizations, suicide attempts or chemical dependency treatment.  Approximately 2 months ago, she began to take citalopram per her primary " care physician, Dr. Vela.  She states that it helps to tamp down her pseudobulbar affect.  She meditates twice per day, with the intention of it helping her to sleep better.    Psychological Screening: Dr.. Carranza was requested to complete the following screening measures:    PHQ-9 Score:  The Patient Health Questionnaire-9 is a depression screening instrument. Scores of 5, 10, 15, and 20 respectively indicate mild, moderate, moderately severe and severe depression symptoms.   PHQ-9 SCORE 8/19/2022 9/19/2022 11/19/2022   PHQ-9 Total Score MyChart 8 (Mild depression) 4 (Minimal depression) 7 (Mild depression)   PHQ-9 Total Score 8 4 7     BRIANNE-7 Score:  The General Anxiety Disorder-7 is an an anxiety screening instrument. Scores of 5, 10, and 15 respectively indicate mild, moderate, and severe anxiety symptoms.  BRIANNE-7 SCORE 8/19/2022 9/19/2022 11/19/2022   Total Score 6 (mild anxiety) 0 (minimal anxiety) 2 (minimal anxiety)   Total Score 6 0 2     CAGE-AID Score: > 1 is a positive screen, suggesting further discussion is needed to determine if evaluation for alcohol or substance abuse is appropriate.  A score > 2 is considered clinically significant, suggesting further evaluation of alcohol or substance-related problems is indicated.  CAGE-AID Total Score 11/19/2022   Total Score 0   Total Score MyChart 0 (A total score of 2 or greater is considered clinically significant)     WHODAS-12 Score:  No flowsheet data found.    PROMIS-10  PROMIS 10 FLOWSHEET DATA 11/19/2022   In general, would you say your health is: 3   In general, would you say your quality of life is: 3   In general, how would you rate your physical health? 3   In general, how would you rate your mental health, including your mood and your ability to think? 2   In general, how would you rate your satisfaction with your social activities and relationships? 3   In general, please rate how well you carry out your usual social activities and roles.  "(This includes activities at home, at work and in your community, and responsibilities as a parent, child, spouse, employee, friend, etc.) 2   To what extent are you able to carry out your everyday physical activities such as walking, climbing stairs, carrying groceries, or moving a chair? 2   In the past 7 days, how often have you been bothered by emotional problems such as feeling anxious, depressed, or irritable? 2   In the past 7 days, how would you rate your fatigue on average? 4   In the past 7 days, how would you rate your pain on average, where 0 means no pain, and 10 means worst imaginable pain? 2   Mental health question re-calculation - no clinical value 4   Physical health question re-calculation - no clinical value 2   Pain question re-calculation - no clinical value 4   Global Mental Health Score 12   Global Physical Health Score 11   PROMIS TOTAL - SUBSCORES 23       The PROMIS-10 measures health-related quality of life and assesses overall health, fatigue, social health, mental health, and physical health.  Raw scores are converted to t-scores (average = 50, SD = 10). Lower t-scores indicate poorer health, higher t-scores indicate better health.   Global Mental Health Score - (P) 12  Global Physical Health Score - (P) 11  PROMIS TOTAL - SUBSCORES - (P) 23    Mental Status/Interview: Dr. Carranza was punctual for today's meeting, was congenial, well-groomed, and oriented.  She acknowledged sometimes feeling sad, especially when exhausted, and crying more easily.  She is able to enjoy activities.  She denies feeling hopeless or worthless though acknowledges feeling \"a little\" helpless and guilty.  Energy is decreased.  Her sleep is described as \"crummy\" though has she has noticed some improvement.  Appetite is good.  She had at 20 pound weight gain following her bleed, and subsequently lost 10 of those pounds.  Memory and decision-making are apparently intact.  She reports concentration is decreased " especially when fatigued.  She denies suicidal and homicidal ideation, hallucinations and delusions.  She acknowledges anxiety in terms of not being able to stop worrying about possibly having another bleed, and room ruminating about it.  Rapport was positive.  She was interested in returning.  We briefly discussed some aspects of pacing in coping with chronic health issues and the impact of chronic health issues on families.    Appearance/Behavior/Orientation: Alert and oriented  Cooperation/Reliability: Cooperative.  Cognition/Memory/Judgment: Not formally assessed, yet no difficulties noted. Excellent historian.  Speech/Language: Speech was clear, logical and coherent, mildly dysarthric.  Thought Content/Form: Appropriate to interview and situation. Logical and organized.No psychosis.  Mood/Affect: Mood euthymic; affect was mood congruent.    Appetite: She described good appetite.    Insight/Motivation:  Appropriate to situation.   Suicide/Assault: She denies suicidal or assaultive ideation, plan, or intent.     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  She is seeking help in adapting better to it and better managing her anxiety related to potential future recurrence.  She is mildly anxious and depressed.    Diagnosis:  Adjustment disorder with mixed anxiety and depressed mood    This telehealth service is appropriate and effective for delivering services in light of the necessity for social distancing to mitigate the COVID-19 epidemic and for conservation of PPE.     Patient has agreed to receiving telehealth services after being informed about it: Yes    Patient prefers video invitation/information to be sent by:   email    Time service started: 1:00  Time service ended: 2:00    Mode of transmission: [email or web address]    Location of originating:  Home of the patient    Distance site:  Home office of provider for [facility]    The patient has been notified  that:  Video visits will be conducted via a call with their psychologist to provide the care they need with a video conversation. Video visits may be billed at different rates depending on insurance coverage.  Patients are advised to please contact their insurance provider with any questions about their health insurance coverage. If during the course of a call the psychologist feels a video visit is not appropriate, patients will not be charged for this service.    Recommendations/Plan: Ms. Carranza will return for video visit 12/13  @ 4 to initiate problem-solving and supportive psychotherapy.  A treatment plan will be completed at that time.      Thank you for this opportunity to participate in your care of this patient.    Larry Yates, Ph.D., A.B.P.P., L.P.  Director, Health Psychology    cc: John Webb M.D.    *In accordance with the Rules of the [place] Board of [employer], it is noted that psychological descriptions and scientific procedures underlying psychological evaluations have limitations.  Absolute predictions cannot be made based on information in this report. An information sheet describing informed consent, limits to confidentiality, clinic scheduling and practices, and feedback from patients was given to the patient.  This note is dictated utilizing voice recognition software. Unfortunately this leads to occasional typographical errors. I apologize in advance if this occurs.  If questions arise, please do not hesitate to contact the author.

## 2022-12-07 ASSESSMENT — ANXIETY QUESTIONNAIRES
IF YOU CHECKED OFF ANY PROBLEMS ON THIS QUESTIONNAIRE, HOW DIFFICULT HAVE THESE PROBLEMS MADE IT FOR YOU TO DO YOUR WORK, TAKE CARE OF THINGS AT HOME, OR GET ALONG WITH OTHER PEOPLE: NOT DIFFICULT AT ALL
2. NOT BEING ABLE TO STOP OR CONTROL WORRYING: NOT AT ALL
7. FEELING AFRAID AS IF SOMETHING AWFUL MIGHT HAPPEN: NOT AT ALL
6. BECOMING EASILY ANNOYED OR IRRITABLE: NOT AT ALL
5. BEING SO RESTLESS THAT IT IS HARD TO SIT STILL: NOT AT ALL
7. FEELING AFRAID AS IF SOMETHING AWFUL MIGHT HAPPEN: NOT AT ALL
8. IF YOU CHECKED OFF ANY PROBLEMS, HOW DIFFICULT HAVE THESE MADE IT FOR YOU TO DO YOUR WORK, TAKE CARE OF THINGS AT HOME, OR GET ALONG WITH OTHER PEOPLE?: NOT DIFFICULT AT ALL
GAD7 TOTAL SCORE: 2
GAD7 TOTAL SCORE: 2
1. FEELING NERVOUS, ANXIOUS, OR ON EDGE: SEVERAL DAYS
4. TROUBLE RELAXING: SEVERAL DAYS
3. WORRYING TOO MUCH ABOUT DIFFERENT THINGS: NOT AT ALL

## 2022-12-13 ENCOUNTER — VIRTUAL VISIT (OUTPATIENT)
Dept: PSYCHOLOGY | Facility: CLINIC | Age: 42
End: 2022-12-13
Payer: COMMERCIAL

## 2022-12-13 DIAGNOSIS — F43.23 ADJUSTMENT DISORDER WITH MIXED ANXIETY AND DEPRESSED MOOD: Primary | ICD-10-CM

## 2022-12-13 PROCEDURE — 90837 PSYTX W PT 60 MINUTES: CPT | Mod: 95 | Performed by: PSYCHOLOGIST

## 2022-12-13 NOTE — PROGRESS NOTES
Health Psychology                     Department of Medicine  Yari Avila, Ph.D., L.P. [phone]                          [facility] Stacey Nguyễn, Ph.D., L.P. [phone]                   [place] Mail [address]   RubyVan, Ph.D., L.P. [phone]       [address] [place] [address] Kathi Thompson, Ph.D., A.B.P.P., L.P. [phone]         [place] [address]          Larry Yates, Ph.D., A.B.P.P., L.P. [phone]     Charis Ruiz, Ph.D., L.P. [phone]  [facility]   Anna Das, Ph.D., A.B.P.P., L.P. [phone]  [address] [place] [address]      Health Psychology Telemental Health Progress Note    Referral Source:  Shawn Ullrich, LICSW,  John Vela M.D.    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  Since then, she has been anxious, fearful of having another bleed, and having some difficulty coping with the residual effects of the last bleed.    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  At that time, she was seen at [facility].  She had paresthesias in her left side and left hand afterwards, but no other effects.  At the beginning of the pandemic, she had a more extensive bleed of a cavernoma's hemangioma, requiring surgery at the [facility], with a 5-week hospitalization, including two weeks in the ICU.  She then had follow-up outpatient occupational therapy, physical therapy and speech therapy.  She reports the main symptoms are fatigue, pain in the left side of her face and fingers varying between a 0 and 7 on a 10 point subjective scale.  She also has left-sided weakness, needs to ambulate with either a walker or  power chair, and is slowed down.  She was frustrated that she is not able to do as much, feels guilty that her  is called upon to handle more activities at home.  She was mildly dysarthric, is now typing slowly.  She had noticed that her concentration is decreased, especially when fatigued.  She otherwise does not feel that there have been changes in her cognition or personality, though stated that she cried more easily due to pseudobulbar affect.    She reported that she was now anxious about having yet another bleed.  Since June, 2022 she had intermittent tingling on her right side.  It is similar to how it felt when she had her previous bleeds.  She stated that she was anxious about doing things and coping with the uncertainty about her future.   She acknowledged some guilt about the impact of her neurological changes on her marriage and disliked feeling dependent, or close to helpless about doing some things.    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  She does not use alcohol or illicit drugs.  She acknowledges drinking 3 mugs of coffee per day.  She has a pescatarian diet.  She is not currently getting OT, PT or speech therapy.  Exercise includes recumbent bike, Pilates machine, yoga and light free weights.  Her neurological care is through the [facility].  Her primary care is at the [facility].  Her rehabilitation therapies have been at [facility].    Past Medical History:   Diagnosis Date     Cavernous hemangioma of brain (H)      Hypothyroid      No past surgical history on file.  Current Outpatient Medications   Medication     cetirizine (ZYRTEC) 10 MG tablet     citalopram (CELEXA) 10 MG tablet     HEMP OIL OR EXTRACT OR OTHER CBD CANNABINOID, NOT MEDICAL CANNABIS,     levothyroxine (SYNTHROID/LEVOTHROID) 100 MCG tablet     memantine XR (NAMENDA XR) 7 MG 24 hr capsule     No current facility-administered medications for this visit.     Wt Readings from Last 4 Encounters:  "  06/28/22 54.9 kg (121 lb)   01/22/20 53.6 kg (118 lb 4 oz)     Estimated body mass index is 18.86 kg/m  as calculated from the following:    Height as of 1/22/20: 1.706 m (5' 7.17\").    Weight as of 6/28/22: 54.9 kg (121 lb).    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  Her parents are alive and well, recently retired from a business selling parts for string instruments.  She has a younger sister with OCD, who now lives in [place] with her  and 5-week old nephew.  She reports good relationships with all family members.    She studied English at [employer] where  she received her bachelor's degree and then in 2017 obtained a PhD in literature from [employer].  She worked for the [employer] for a few years and then the [employer] since 2019 where she is a humanities .  She had worked for a [employer] company in [place] dealing with finances in between her degrees.  She is currently working full time, going in to the office one afternoon per week.  She recently attended two conferences a week apart and feels it took 10 days to recover.    She has been  for 12 years to her  Ramez.  He works for an [employer] start up, [email or web address].  She played the piano, studying for 7 years.   Since her second bleed has tried to return to playing a keyboard as a form of therapy.  She enjoys playing games such as canTuckerNucka, cribbage, backgammen with her  and friends.  She is not driving due to the visual problems.    Psychiatric History: Dr. Carranza obtained counseling during college and briefly thereafter on a group and individual basis to address some social anxiety and her existential dread.  She did not feel like she was actually feeling depressed at that time.  There is no history of psychiatric hospitalizations, suicide attempts or chemical dependency treatment.  Approximately 2 months ago, she began to take citalopram per her primary " care physician, Dr. Vela.  She states that it helps to tamp down her pseudobulbar affect.  She meditates twice per day, with the intention of it helping her to sleep better.    Psychological Screening: Dr.. Carranza was requested to complete the following screening measures:    PHQ-9 Score:  The Patient Health Questionnaire-9 is a depression screening instrument. Scores of 5, 10, 15, and 20 respectively indicate mild, moderate, moderately severe and severe depression symptoms.   PHQ-9 SCORE 8/19/2022 9/19/2022 11/19/2022   PHQ-9 Total Score MyChart 8 (Mild depression) 4 (Minimal depression) 7 (Mild depression)   PHQ-9 Total Score 8 4 7     BRIANNE-7 Score:  The General Anxiety Disorder-7 is an an anxiety screening instrument. Scores of 5, 10, and 15 respectively indicate mild, moderate, and severe anxiety symptoms.  BRIANNE-7 SCORE 9/19/2022 11/19/2022 12/7/2022   Total Score 0 (minimal anxiety) 2 (minimal anxiety) 2 (minimal anxiety)   Total Score 0 2 2     CAGE-AID Score: > 1 is a positive screen, suggesting further discussion is needed to determine if evaluation for alcohol or substance abuse is appropriate.  A score > 2 is considered clinically significant, suggesting further evaluation of alcohol or substance-related problems is indicated.  CAGE-AID Total Score 11/19/2022   Total Score 0   Total Score MyChart 0 (A total score of 2 or greater is considered clinically significant)     WHODAS-12 Score:  No flowsheet data found.    PROMIS-10  PROMIS 10 FLOWSHEET DATA 11/19/2022   In general, would you say your health is: 3   In general, would you say your quality of life is: 3   In general, how would you rate your physical health? 3   In general, how would you rate your mental health, including your mood and your ability to think? 2   In general, how would you rate your satisfaction with your social activities and relationships? 3   In general, please rate how well you carry out your usual social activities and roles.  "(This includes activities at home, at work and in your community, and responsibilities as a parent, child, spouse, employee, friend, etc.) 2   To what extent are you able to carry out your everyday physical activities such as walking, climbing stairs, carrying groceries, or moving a chair? 2   In the past 7 days, how often have you been bothered by emotional problems such as feeling anxious, depressed, or irritable? 2   In the past 7 days, how would you rate your fatigue on average? 4   In the past 7 days, how would you rate your pain on average, where 0 means no pain, and 10 means worst imaginable pain? 2   Mental health question re-calculation - no clinical value 4   Physical health question re-calculation - no clinical value 2   Pain question re-calculation - no clinical value 4   Global Mental Health Score 12   Global Physical Health Score 11   PROMIS TOTAL - SUBSCORES 23       The PROMIS-10 measures health-related quality of life and assesses overall health, fatigue, social health, mental health, and physical health.  Raw scores are converted to t-scores (average = 50, SD = 10). Lower t-scores indicate poorer health, higher t-scores indicate better health.   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 She had variable affect, briefly being on the verge of tears discussing her limitations.  She inds her limited energy is the hardest thing to accept.    \"I keep having grief over what I can't do.\"  She couldn't hold her nephew at Thanksgiving.  She feels she can't be herself. Talking is tiring.  So accepting the changes in her fucncioning is hard.    We discussed multiple aspects of coping with disability and chronic illness.  She appeared to find it helpful, recognizing that there are different perspectives she=could develop that might help her " become less frustrated.   Discusses the gap between her beliefs about interdependence and her feelings about being dependent on others (e.g., no longer cooking so as to save energy).     She participated fully and appeared to derive benefit.  Rapport was excellent.      Appearance/Behavior/Orientation: Alert and oriented  Cooperation/Reliability: Cooperative.  Cognition/Memory/Judgment: Not formally assessed, no difficulties noted. Excellent historian.  Speech/Language: Speech was clear, logical and coherent, mildly dysarthric and slow tempo.  She is self-conscious about it.  Thought Content/Form: Appropriate to interview and situation. Logical and organized.No psychosis.  Mood/Affect: Mood euthymic generally, with sadness at times. affect was mood congruent.    Insight/Motivation:  High  Suicide/Assault: She denies suicidal or assaultive ideation, plan, or intent.       Diagnosis:  Adjustment disorder with mixed anxiety and depressed mood    This telehealth service is appropriate and effective for delivering services in light of the necessity for social distancing to mitigate the COVID-19 epidemic and for conservation of PPE.     Patient has agreed to receiving telehealth services after being informed about it: Yes    Patient prefers video invitation/information to be sent by:   email    Time service started: 4:52  Time service ended: 4:55  Extended session due to complexity of case and length of interval.    Mode of transmission: [email or web address]    Location of originating:  Home of the patient    Distance site:  Home office of provider for [facility]    The patient has been notified that:  Video visits will be conducted via a call with their psychologist to provide the care they need with a video conversation. Video visits may be billed at different rates depending on insurance coverage.  Patients are advised to please contact their insurance provider with any questions about their health insurance coverage. If during the  course of a call the psychologist feels a video visit is not appropriate, patients will not be charged for this service.    Recommendations/Plan: Ms. Carranza will return for video visit 1/23  @ 4 to continue  problem-solving and supportive psychotherapy.  A treatment plan will be completed at that time.    Bibliotherapy:  [facility]  Last treatment plan signed:  Treatment plan due:    1/23  @ 4                         Larry Yates, Ph.D., A.B.P.P., L.P.  Director, Health Psychology

## 2023-01-19 ASSESSMENT — ANXIETY QUESTIONNAIRES
6. BECOMING EASILY ANNOYED OR IRRITABLE: NOT AT ALL
4. TROUBLE RELAXING: NOT AT ALL
GAD7 TOTAL SCORE: 0
1. FEELING NERVOUS, ANXIOUS, OR ON EDGE: NOT AT ALL
7. FEELING AFRAID AS IF SOMETHING AWFUL MIGHT HAPPEN: NOT AT ALL
5. BEING SO RESTLESS THAT IT IS HARD TO SIT STILL: NOT AT ALL
3. WORRYING TOO MUCH ABOUT DIFFERENT THINGS: NOT AT ALL
IF YOU CHECKED OFF ANY PROBLEMS ON THIS QUESTIONNAIRE, HOW DIFFICULT HAVE THESE PROBLEMS MADE IT FOR YOU TO DO YOUR WORK, TAKE CARE OF THINGS AT HOME, OR GET ALONG WITH OTHER PEOPLE: NOT DIFFICULT AT ALL
2. NOT BEING ABLE TO STOP OR CONTROL WORRYING: NOT AT ALL

## 2023-01-21 ASSESSMENT — ANXIETY QUESTIONNAIRES
4. TROUBLE RELAXING: NOT AT ALL
6. BECOMING EASILY ANNOYED OR IRRITABLE: NOT AT ALL
GAD7 TOTAL SCORE: 0
3. WORRYING TOO MUCH ABOUT DIFFERENT THINGS: NOT AT ALL
IF YOU CHECKED OFF ANY PROBLEMS ON THIS QUESTIONNAIRE, HOW DIFFICULT HAVE THESE PROBLEMS MADE IT FOR YOU TO DO YOUR WORK, TAKE CARE OF THINGS AT HOME, OR GET ALONG WITH OTHER PEOPLE: NOT DIFFICULT AT ALL
GAD7 TOTAL SCORE: 0
8. IF YOU CHECKED OFF ANY PROBLEMS, HOW DIFFICULT HAVE THESE MADE IT FOR YOU TO DO YOUR WORK, TAKE CARE OF THINGS AT HOME, OR GET ALONG WITH OTHER PEOPLE?: NOT DIFFICULT AT ALL
7. FEELING AFRAID AS IF SOMETHING AWFUL MIGHT HAPPEN: NOT AT ALL
5. BEING SO RESTLESS THAT IT IS HARD TO SIT STILL: NOT AT ALL
2. NOT BEING ABLE TO STOP OR CONTROL WORRYING: NOT AT ALL
7. FEELING AFRAID AS IF SOMETHING AWFUL MIGHT HAPPEN: NOT AT ALL
1. FEELING NERVOUS, ANXIOUS, OR ON EDGE: NOT AT ALL

## 2023-01-23 ENCOUNTER — VIRTUAL VISIT (OUTPATIENT)
Dept: PSYCHOLOGY | Facility: CLINIC | Age: 43
End: 2023-01-23
Payer: COMMERCIAL

## 2023-01-23 DIAGNOSIS — F43.23 ADJUSTMENT DISORDER WITH MIXED ANXIETY AND DEPRESSED MOOD: Primary | ICD-10-CM

## 2023-01-23 PROCEDURE — 90837 PSYTX W PT 60 MINUTES: CPT | Mod: GT | Performed by: PSYCHOLOGIST

## 2023-01-23 NOTE — PROGRESS NOTES
Health Psychology                     Department of Medicine  Yari Avila, Ph.D., L.P. [phone]                          [facility] Stacey Nguyễn, Ph.D., L.P. [phone]                   [place] Mail Code [address]49   RubyVan, Ph.D., L.P. [phone]       [address] [place] [address] Kathi Thompson, Ph.D., A.B.P.P., L.P. [phone]         [place] [address]          Larry Yates, Ph.D., A.B.P.P., L.P. [phone]     Charis Ruiz, Ph.D., L.P. [phone]  [facility]   Anna Das, Ph.D., A.B.P.P., L.P. [phone]  [address] [place] [address]     Health Psychology Telemental Health Progress Note    Referral Source:  Shawn Ullrich, LICSW,  John Vela M.D.    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  Since then, she has been anxious, fearful of having another bleed, and having some difficulty coping with the residual effects of the last bleed.    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  At that time, she was seen at [facility].  She had paresthesias in her left side and left hand afterwards, but no other effects.  At the beginning of the pandemic, she had a more extensive bleed of a cavernoma's hemangioma, requiring surgery at the [facility], with a 5-week hospitalization, including two weeks in the ICU.  She then had follow-up outpatient occupational therapy, physical therapy and speech therapy.  She reported the main symptoms are fatigue, pain in the left side of her face and fingers varying between a 0 and 7 on a 10 point subjective scale.  She also has left-sided weakness, needs to ambulate with either a walker or  power chair, and is slowed down.  She was frustrated that she is not able to do as much, feels guilty that her  is called upon to handle more activities at home.  She was mildly dysarthric, is now typing slowly.  She had noticed that her concentration is decreased, especially when fatigued.  She otherwise does not feel that there have been changes in her cognition or personality, though stated that she cried more easily due to pseudobulbar affect.    She reported that she was now anxious about having yet another bleed.  Since June, 2022 she had intermittent tingling on her right side.  It is similar to how it felt when she had her previous bleeds.  She stated that she was anxious about doing things and coping with the uncertainty about her future.   She acknowledged some guilt about the impact of her neurological changes on her marriage and disliked feeling dependent, or close to helpless about doing some things.    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  She does not use alcohol or illicit drugs.  She acknowledges drinking three  mugs of coffee per day.  She follows a pescatarian diet.  She is not currently getting OT, PT or speech therapy.  Exercise includes recumbent bike, Pilates machine, yoga and light free weights.  Her neurological care is through the [facility].  Her primary care is at the [facility].  Her rehabilitation therapies have been at [facility].    Past Medical History:   Diagnosis Date     Cavernous hemangioma of brain (H)      Hypothyroid      No past surgical history on file.  Current Outpatient Medications   Medication     cetirizine (ZYRTEC) 10 MG tablet     citalopram (CELEXA) 10 MG tablet     HEMP OIL OR EXTRACT OR OTHER CBD CANNABINOID, NOT MEDICAL CANNABIS,     levothyroxine (SYNTHROID/LEVOTHROID) 100 MCG tablet     memantine XR (NAMENDA XR) 7 MG 24 hr capsule     No current facility-administered medications for this visit.     Wt Readings from Last 4 Encounters:  "  06/28/22 54.9 kg (121 lb)   01/22/20 53.6 kg (118 lb 4 oz)     Estimated body mass index is 18.86 kg/m  as calculated from the following:    Height as of 1/22/20: 1.706 m (5' 7.17\").    Weight as of 6/28/22: 54.9 kg (121 lb).    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  Her parents are alive and well, recently retired from a business selling parts for string instruments.  She has a younger sister with OCD, who now lives in [place] with her  and 5-week old nephew.  She reported good relationships with all family members.    She studied English at [employer] where  she received her bachelor's degree and then in 2017 obtained a PhD in literature from [email or web address].  She worked for the [employer] for a few years and then the [employer] since 2019 where she is a humanities .  She had worked for a [employer] company in [place] dealing with finances in between her degrees.  She is currently working full time, going in to the office one afternoon per week.  She recently attended two conferences a week apart and feels it took 10 days to recover.    She had been  for 12 years to her  Ramez.  He works for an [employer], [email or web address].  She played the piano, studying for 7 years.   Since her second bleed has tried to return to playing a keyboard as a form of therapy.  She enjoysed playing games such as canasta, cribbage, backgammen with her  and friends.  She was not driving due to the visual problems.    Psychiatric History: Dr. Carranza obtained counseling during college and briefly thereafter on a group and individual basis to address some social anxiety and her existential dread.  She did not feel like she was actually feeling depressed at that time.  There is no history of psychiatric hospitalizations, suicide attempts or chemical dependency treatment.  Approximately 2 months ago, she began to take citalopram " per her primary care physician, Dr. Vela.  She stated that it helps to tamp down her pseudobulbar affect.  She was meditating twice per day, with the intention of it helping her to sleep better.    Psychological Screening: Dr.. Carranza was requested to complete the following screening measures:    PHQ-9 Score:  The Patient Health Questionnaire-9 is a depression screening instrument. Scores of 5, 10, 15, and 20 respectively indicate mild, moderate, moderately severe and severe depression symptoms.   PHQ-9 SCORE 8/19/2022 9/19/2022 11/19/2022   PHQ-9 Total Score MyChart 8 (Mild depression) 4 (Minimal depression) 7 (Mild depression)   PHQ-9 Total Score 8 4 7     BRIANNE-7 Score:  The General Anxiety Disorder-7 is an an anxiety screening instrument. Scores of 5, 10, and 15 respectively indicate mild, moderate, and severe anxiety symptoms.  BRIANNE-7 SCORE 12/7/2022 1/19/2023 1/21/2023   Total Score 2 (minimal anxiety) - 0 (minimal anxiety)   Total Score 2 0 0     CAGE-AID Score: > 1 is a positive screen, suggesting further discussion is needed to determine if evaluation for alcohol or substance abuse is appropriate.  A score > 2 is considered clinically significant, suggesting further evaluation of alcohol or substance-related problems is indicated.  CAGE-AID Total Score 11/19/2022   Total Score 0   Total Score MyChart 0 (A total score of 2 or greater is considered clinically significant)     WHODAS-12 Score:  No flowsheet data found.    PROMIS-10  PROMIS 10 FLOWSHEET DATA 11/19/2022   In general, would you say your health is: 3   In general, would you say your quality of life is: 3   In general, how would you rate your physical health? 3   In general, how would you rate your mental health, including your mood and your ability to think? 2   In general, how would you rate your satisfaction with your social activities and relationships? 3   In general, please rate how well you carry out your usual social activities and roles.  (This includes activities at home, at work and in your community, and responsibilities as a parent, child, spouse, employee, friend, etc.) 2   To what extent are you able to carry out your everyday physical activities such as walking, climbing stairs, carrying groceries, or moving a chair? 2   In the past 7 days, how often have you been bothered by emotional problems such as feeling anxious, depressed, or irritable? 2   In the past 7 days, how would you rate your fatigue on average? 4   In the past 7 days, how would you rate your pain on average, where 0 means no pain, and 10 means worst imaginable pain? 2   Mental health question re-calculation - no clinical value 4   Physical health question re-calculation - no clinical value 2   Pain question re-calculation - no clinical value 4   Global Mental Health Score 12   Global Physical Health Score 11   PROMIS TOTAL - SUBSCORES 23       The PROMIS-10 measures health-related quality of life and assesses overall health, fatigue, social health, mental health, and physical health.  Raw scores are converted to t-scores (average = 50, SD = 10). Lower t-scores indicate poorer health, higher t-scores indicate better health.   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 She had variable affect, briefly being on the verge of tears discussing her limitations.  She inds her limited energy is the hardest thing to accept.  We discussed self-acceptance of limitations related to health issues.  She found it helpful, recognizes the need to do it.     Most of the session was spent discussing  her anxiety of tingling (initial sx of her bleed).   Whenever it happens she gets anxious. She had two bleeds in the past.  One resulted in pain in left fingers. The other causes more problems.  It was 5.5 years after the first.   "Generally these last 10 minutes to 4 hours.  Sometimes they are daily or a few months can intercede between them.    Discussed strategies for coping (e.g., sx diary to better understand pattern; developing \"a protocol\" for what she should do when once experiencing it).  She ound those suggestions helpful and would like to understand the pattern better.       She reported that the first bleed  She went to ER and they couldn't find anything.  It took 6 months to realize what happened.  She was told  The risk for repeat went down after 5 years, but at 57 the more severe bleed occurred.  She  Reports going to [place], it taking 6 days until they intervened (with craniotomy).  She now is fearful whenever having the sx, and is willing to use approaches discussed above.     We discussed multiple aspects of coping with disability and chronic illness.  She appeared to find it helpful, recognizing that there are different perspectives she could develop that might help her become less frustrated and anxious.   Discusses the gap between her beliefs about interdependence and her feelings about being dependent on others (e.g., no longer cooking so as to save energy).  She sometimes meditates when tired n the afternoon which tends to help.  She doesn't know why she stopped it as a routine practice and is willing to again.      Bibliotherapy:  [facility] (she found it helpful)    She participated fully and appeared to derive benefit.  Rapport was excellent.    Appearance/Behavior/Orientation: Alert and oriented  Cooperation/Reliability: Cooperative.  Cognition/Memory/Judgment: Not formally assessed, no difficulties noted. Excellent historian.  Speech/Language: Speech was clear, logical and coherent, mildly dysarthric and slow tempo.  She is self-conscious about it.  Thought Content/Form: Appropriate to interview and situation. Logical and organized.No psychosis.  Mood/Affect: Mood euthymic generally, with sadness at times. " affect was mood congruent.    Insight/Motivation:  High  Suicide/Assault: She denies suicidal or assaultive ideation, plan, or intent.     Diagnosis:  Adjustment disorder with mixed anxiety and depressed mood    This telehealth service is appropriate and effective for delivering services in light of the necessity for social distancing to mitigate the COVID-19 epidemic and for conservation of PPE.     Patient has agreed to receiving telehealth services after being informed about it: Yes    Patient prefers video invitation/information to be sent by:   email    Time service started: 5:05  Time service ended:6:01  Extended session due to complexity of case and length of interval.    Mode of transmission: [email or web address]    Location of originating:  Home of the patient    Distance site:  Home office of provider for [facility]    The patient has been notified that:  Video visits will be conducted via a call with their psychologist to provide the care they need with a video conversation. Video visits may be billed at different rates depending on insurance coverage.  Patients are advised to please contact their insurance provider with any questions about their health insurance coverage. If during the course of a call the psychologist feels a video visit is not appropriate, patients will not be charged for this service.    Recommendations/Plan: Ms. Carranza will return for video visit 2/22  @ 4 to continue  problem-solving and supportive psychotherapy.  A treatment plan will be completed at that time.      Last treatment plan signed:  Treatment plan due:    2/24/22     @ 4                         Larry Yates, Ph.D., A.B.P.P., L.P.  Director, Health Psychology

## 2023-02-16 ASSESSMENT — ANXIETY QUESTIONNAIRES
7. FEELING AFRAID AS IF SOMETHING AWFUL MIGHT HAPPEN: NOT AT ALL
2. NOT BEING ABLE TO STOP OR CONTROL WORRYING: NOT AT ALL
GAD7 TOTAL SCORE: 0
GAD7 TOTAL SCORE: 0
4. TROUBLE RELAXING: NOT AT ALL
3. WORRYING TOO MUCH ABOUT DIFFERENT THINGS: NOT AT ALL
6. BECOMING EASILY ANNOYED OR IRRITABLE: NOT AT ALL
GAD7 TOTAL SCORE: 0
7. FEELING AFRAID AS IF SOMETHING AWFUL MIGHT HAPPEN: NOT AT ALL
1. FEELING NERVOUS, ANXIOUS, OR ON EDGE: NOT AT ALL
5. BEING SO RESTLESS THAT IT IS HARD TO SIT STILL: NOT AT ALL

## 2023-02-22 ENCOUNTER — VIRTUAL VISIT (OUTPATIENT)
Dept: PSYCHOLOGY | Facility: CLINIC | Age: 43
End: 2023-02-22
Payer: COMMERCIAL

## 2023-02-22 DIAGNOSIS — F43.23 ADJUSTMENT DISORDER WITH MIXED ANXIETY AND DEPRESSED MOOD: Primary | ICD-10-CM

## 2023-02-22 PROCEDURE — 90837 PSYTX W PT 60 MINUTES: CPT | Mod: VID | Performed by: PSYCHOLOGIST

## 2023-02-22 NOTE — PROGRESS NOTES
Health Psychology                     Department of Medicine  Yari Avila, Ph.D., L.P. [phone]                          [facility] Stacey Nguyễn, Ph.D., L.P. [phone]                   [place] Mail Code 659   RubyVan, Ph.D., L.P. [phone]       [address] [place] [address] Kathi Thompson, Ph.D., A.B.P.P., L.P. [phone]         [place] [address]          Larry Yates, Ph.D., A.B.P.P., L.P. [phone]     Charis Ruiz, Ph.D., L.P. [phone]  [facility]   Anna Das, Ph.D., A.B.P.P., L.P. [phone]  [address] [place] [address]     Health Psychology Telemental Health Progress Note    Referral Source:  Shawn Ullrich, LICSW,  John Vela M.D.    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  Since then, she has been anxious, fearful of having another bleed, and having some difficulty coping with the residual effects of the last bleed.    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  At that time, she was seen at [facility].  She had paresthesias in her left side and left hand afterwards, but no other effects.  At the beginning of the pandemic, she had a more extensive bleed of a cavernoma's hemangioma, requiring surgery at the [facility], with a 5-week hospitalization, including two weeks in the ICU.  She then had follow-up outpatient occupational therapy, physical therapy and speech therapy.  She reported the main symptoms are fatigue, pain in the left side of her face and fingers varying between a 0 and 7 on a 10 point subjective scale.  She also has left-sided weakness, needs to ambulate with either a walker or  power chair, and is slowed down.  She was frustrated that she is not able to do as much, feels guilty that her  is called upon to handle more activities at home.  She was mildly dysarthric, is now typing slowly.  She had noticed that her concentration is decreased, especially when fatigued.  She otherwise does not feel that there have been changes in her cognition or personality, though stated that she cried more easily due to pseudobulbar affect.    She reported that she was now anxious about having yet another bleed.  Since June, 2022 she had intermittent tingling on her right side.  It is similar to how it felt when she had her previous bleeds.  She stated that she was anxious about doing things and coping with the uncertainty about her future.   She acknowledged some guilt about the impact of her neurological changes on her marriage and disliked feeling dependent, or close to helpless about doing some things.    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  She does not use alcohol or illicit drugs.  She acknowledges drinking three  mugs of coffee per day.  She follows a pescatarian diet.  She is not currently getting OT, PT or speech therapy.  Exercise includes recumbent bike, Pilates machine, yoga and light free weights.  Her neurological care is through the [facility].  Her primary care is at the [facility].  Her rehabilitation therapies have been at [facility].    Past Medical History:   Diagnosis Date     Cavernous hemangioma of brain (H)      Hypothyroid      No past surgical history on file.  Current Outpatient Medications   Medication     cetirizine (ZYRTEC) 10 MG tablet     citalopram (CELEXA) 10 MG tablet     HEMP OIL OR EXTRACT OR OTHER CBD CANNABINOID, NOT MEDICAL CANNABIS,     levothyroxine (SYNTHROID/LEVOTHROID) 100 MCG tablet     memantine XR (NAMENDA XR) 7 MG 24 hr capsule     No current facility-administered medications for this visit.     Wt Readings from Last 4 Encounters:  "  06/28/22 54.9 kg (121 lb)   01/22/20 53.6 kg (118 lb 4 oz)     Estimated body mass index is 18.86 kg/m  as calculated from the following:    Height as of 1/22/20: 1.706 m (5' 7.17\").    Weight as of 6/28/22: 54.9 kg (121 lb).    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  Her parents are alive and well, recently retired from a business selling parts for string instruments.  She has a younger sister with OCD, who now lives in [place] with her  and 5-week old nephew.  She reported good relationships with all family members.    She studied English at [employer] where  she received her bachelor's degree and then in 2017 obtained a PhD in literature from [employer].  She worked for the [employer] for a few years and then the [employer] since 2019 where she is a humanities .  She had worked for a "[employer] company in [place] dealing with finances in between her degrees.  She is currently working full time, going in to the office one afternoon per week.  She recently attended two conferences a week apart and feels it took 10 days to recover.    She had been  for 12 years to her  Ramez.  He works for an [employer] start up, [email or web address].  She played the piano, studying for 7 years.   Since her second bleed has tried to return to playing a keyboard as a form of therapy.  She enjoysed playing games such as canasta, cribbage, backgammen with her  and friends.  She was not driving due to the visual problems.    Psychiatric History: Dr. Carranza obtained counseling during college and briefly thereafter on a group and individual basis to address some social anxiety and her existential dread.  She did not feel like she was actually feeling depressed at that time.  There is no history of psychiatric hospitalizations, suicide attempts or chemical dependency treatment.  Approximately 2 months ago, she began to take citalopram " per her primary care physician, Dr. Vela.  She stated that it helps to tamp down her pseudobulbar affect.  She was meditating twice per day, with the intention of it helping her to sleep better.    Psychological Screening: Dr.. Carranza was requested to complete the following screening measures:    PHQ-9 Score:  The Patient Health Questionnaire-9 is a depression screening instrument. Scores of 5, 10, 15, and 20 respectively indicate mild, moderate, moderately severe and severe depression symptoms.   PHQ-9 SCORE 8/19/2022 9/19/2022 11/19/2022   PHQ-9 Total Score MyChart 8 (Mild depression) 4 (Minimal depression) 7 (Mild depression)   PHQ-9 Total Score 8 4 7     BRIANNE-7 Score:  The General Anxiety Disorder-7 is an an anxiety screening instrument. Scores of 5, 10, and 15 respectively indicate mild, moderate, and severe anxiety symptoms.  BRIANNE-7 SCORE 1/19/2023 1/21/2023 2/16/2023   Total Score - 0 (minimal anxiety) 0 (minimal anxiety)   Total Score 0 0 0     CAGE-AID Score: > 1 is a positive screen, suggesting further discussion is needed to determine if evaluation for alcohol or substance abuse is appropriate.  A score > 2 is considered clinically significant, suggesting further evaluation of alcohol or substance-related problems is indicated.  CAGE-AID Total Score 11/19/2022   Total Score 0   Total Score MyChart 0 (A total score of 2 or greater is considered clinically significant)     WHODAS-12 Score:  No flowsheet data found.    PROMIS-10  PROMIS 10 FLOWSHEET DATA 11/19/2022 2/16/2023   In general, would you say your health is: 3 3   In general, would you say your quality of life is: 3 4   In general, how would you rate your physical health? 3 3   In general, how would you rate your mental health, including your mood and your ability to think? 2 2   In general, how would you rate your satisfaction with your social activities and relationships? 3 4   In general, please rate how well you carry out your usual social  activities and roles. (This includes activities at home, at work and in your community, and responsibilities as a parent, child, spouse, employee, friend, etc.) 2 3   To what extent are you able to carry out your everyday physical activities such as walking, climbing stairs, carrying groceries, or moving a chair? 2 2   In the past 7 days, how often have you been bothered by emotional problems such as feeling anxious, depressed, or irritable? 2 2   In the past 7 days, how would you rate your fatigue on average? 4 4   In the past 7 days, how would you rate your pain on average, where 0 means no pain, and 10 means worst imaginable pain? 2 2   Mental health question re-calculation - no clinical value 4 4   Physical health question re-calculation - no clinical value 2 2   Pain question re-calculation - no clinical value 4 4   Global Mental Health Score 12 14   Global Physical Health Score 11 11   PROMIS TOTAL - SUBSCORES 23 25       The PROMIS-10 measures health-related quality of life and assesses overall health, fatigue, social health, mental health, and physical health.  Raw scores are converted to t-scores (average = 50, SD = 10). Lower t-scores indicate poorer health, higher t-scores indicate better health.   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 She had variable affect.  She has limited energy which is the hardest thing to accept.  We discussed self-acceptance of limitations related to health issues.  She found it helpful, recognizes the need to do it.  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   Discussed her fear about whether she can meet expectations and keep working.  Discussed options (e.g. < full-time position, taking breaks, meditating, butting out other stimulation like  pleasure reading)    She had eye surgery in October for convergence, experiencing esotropia and diplopia. Is looking into prisms.     We continued discussion of multiple aspects of coping with disability and chronic illness.  She appeared to find it helpful, recognizing that there are different perspectives she could develop that might help her become less frustrated and anxious.       She sometimes meditates when tired n the afternoon which tends to help.  She doesn't know why she stopped it as a routine practice and is willing to again.   Encouraged her to do that instead of pleasure reading.    She is seeing a hypnotist, Judith Promberg, for sleep and spasticity.  She thinks it helps.      She participated fully and appeared to derive benefit.  Rapport was excellent.    A treatment plan was completed.  Appearance/Behavior/Orientation: Alert and oriented  Cooperation/Reliability: Cooperative.  Cognition/Memory/Judgment: Not formally assessed, no difficulties noted. Excellent historian.  Speech/Language: Speech was clear, logical and coherent, mildly dysarthric and slow tempo.  She is self-conscious about it.  Thought Content/Form: Appropriate to interview and situation. Logical and organized.No psychosis.  Mood/Affect: Mood euthymic generally, with sadness at times. affect was mood congruent.    Insight/Motivation:  High  Suicide/Assault: She denies suicidal or assaultive ideation, plan, or intent.     Diagnosis:  Adjustment disorder with mixed anxiety and depressed mood    This telehealth service is appropriate and effective for delivering services in light of the necessity for social distancing to mitigate the COVID-19 epidemic and for conservation of PPE.     Patient has agreed to receiving telehealth services after being informed about it: Yes    Patient prefers video invitation/information to be sent by:   email    Time service started: 4:05  Time service ended:  4:58  Extended session due to complexity of  case and length of interval.    Mode of transmission: [email or web address]    Location of originating:  Home of the patient    Distance site:  Home office of provider for [facility]    The patient has been notified that:  Video visits will be conducted via a call with their psychologist to provide the care they need with a video conversation. Video visits may be billed at different rates depending on insurance coverage.  Patients are advised to please contact their insurance provider with any questions about their health insurance coverage. If during the course of a call the psychologist feels a video visit is not appropriate, patients will not be charged for this service.    Recommendations/Plan: Ms. Carranza will return for video visit 3/24 @ 3  to continue  problem-solving and supportive psychotherapy c/w treatment plan .    Last treatment plan signed: 2/22/23       Treatment plan due:    2/22/24                           Larry Yates, Ph.D., A.B.P.P., L.P.  Director, Health Psychology

## 2023-03-22 ASSESSMENT — ANXIETY QUESTIONNAIRES
GAD7 TOTAL SCORE: 2
GAD7 TOTAL SCORE: 2
7. FEELING AFRAID AS IF SOMETHING AWFUL MIGHT HAPPEN: SEVERAL DAYS
7. FEELING AFRAID AS IF SOMETHING AWFUL MIGHT HAPPEN: SEVERAL DAYS
IF YOU CHECKED OFF ANY PROBLEMS ON THIS QUESTIONNAIRE, HOW DIFFICULT HAVE THESE PROBLEMS MADE IT FOR YOU TO DO YOUR WORK, TAKE CARE OF THINGS AT HOME, OR GET ALONG WITH OTHER PEOPLE: SOMEWHAT DIFFICULT
2. NOT BEING ABLE TO STOP OR CONTROL WORRYING: SEVERAL DAYS
3. WORRYING TOO MUCH ABOUT DIFFERENT THINGS: NOT AT ALL
4. TROUBLE RELAXING: NOT AT ALL
8. IF YOU CHECKED OFF ANY PROBLEMS, HOW DIFFICULT HAVE THESE MADE IT FOR YOU TO DO YOUR WORK, TAKE CARE OF THINGS AT HOME, OR GET ALONG WITH OTHER PEOPLE?: SOMEWHAT DIFFICULT
6. BECOMING EASILY ANNOYED OR IRRITABLE: NOT AT ALL
5. BEING SO RESTLESS THAT IT IS HARD TO SIT STILL: NOT AT ALL
1. FEELING NERVOUS, ANXIOUS, OR ON EDGE: NOT AT ALL

## 2023-03-24 ENCOUNTER — VIRTUAL VISIT (OUTPATIENT)
Dept: PSYCHOLOGY | Facility: CLINIC | Age: 43
End: 2023-03-24
Payer: COMMERCIAL

## 2023-03-24 DIAGNOSIS — F43.23 ADJUSTMENT DISORDER WITH MIXED ANXIETY AND DEPRESSED MOOD: Primary | ICD-10-CM

## 2023-03-24 PROCEDURE — 90837 PSYTX W PT 60 MINUTES: CPT | Mod: VID | Performed by: PSYCHOLOGIST

## 2023-03-24 NOTE — PROGRESS NOTES
Health Psychology                     Department of Medicine  Yari Avila, Ph.D., L.P. [phone]                          [facility] Stacey Nguyễn, Ph.D., L.P. [phone]                   [place] Mail Code 866   RubyVan, Ph.D., L.P. [phone]       [address] [place] [address] Kathi Thompson, Ph.D., A.B.P.P., L.P. [phone]         [place] [address]          Larry Yates, Ph.D., A.B.P.P., L.P. [phone]     Charis Ruiz, Ph.D., L.P. [phone]  [facility]   Anna Das, Ph.D., A.B.P.P., L.P. [phone]  [address] [place] [address]     Health Psychology Telemental Health Progress Note    Referral Source:  Shawn Ullrich, LICSW,  John Vela M.D.    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  Since then, she has been anxious, fearful of having another bleed, and having some difficulty coping with the residual effects of the last bleed.    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  At that time, she was seen at [facility].  She had paresthesias in her left side and left hand afterwards, but no other effects.  At the beginning of the pandemic, she had a more extensive bleed of a cavernoma's hemangioma, requiring surgery at the [facility], with a 5-week hospitalization, including two weeks in the ICU.  She then had follow-up outpatient occupational therapy, physical therapy and speech therapy.  She reported the main symptoms are fatigue, pain in the left side of her face and fingers varying between a 0 and 7 on a 10 point subjective scale.  She also has left-sided weakness, needs to ambulate with either a walker or  power chair, and is slowed down.  She was frustrated that she is not able to do as much, feels guilty that her  is called upon to handle more activities at home.  She was mildly dysarthric, is now typing slowly.  She had noticed that her concentration is decreased, especially when fatigued.  She otherwise does not feel that there have been changes in her cognition or personality, though stated that she cried more easily due to pseudobulbar affect.    She reported that she was now anxious about having yet another bleed.  Since June, 2022 she had intermittent tingling on her right side.  It is similar to how it felt when she had her previous bleeds.  She stated that she was anxious about doing things and coping with the uncertainty about her future.   She acknowledged some guilt about the impact of her neurological changes on her marriage and disliked feeling dependent, or close to helpless about doing some things.    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  She does not use alcohol or illicit drugs.  She acknowledges drinking three  mugs of coffee per day.  She follows a pescatarian diet.  She is not currently getting OT, PT or speech therapy.  Exercise includes recumbent bike, Pilates machine, yoga and light free weights.  Her neurological care is through the [facility].  Her primary care is at the [facility].  Her rehabilitation therapies have been at [facility].    Past Medical History:   Diagnosis Date     Cavernous hemangioma of brain (H)      Hypothyroid      No past surgical history on file.  Current Outpatient Medications   Medication     cetirizine (ZYRTEC) 10 MG tablet     citalopram (CELEXA) 10 MG tablet     HEMP OIL OR EXTRACT OR OTHER CBD CANNABINOID, NOT MEDICAL CANNABIS,     levothyroxine (SYNTHROID/LEVOTHROID) 100 MCG tablet     memantine XR (NAMENDA XR) 7 MG 24 hr capsule     No current facility-administered medications for this visit.     Wt Readings from Last 4 Encounters:  "  06/28/22 54.9 kg (121 lb)   01/22/20 53.6 kg (118 lb 4 oz)     Estimated body mass index is 18.86 kg/m  as calculated from the following:    Height as of 1/22/20: 1.706 m (5' 7.17\").    Weight as of 6/28/22: 54.9 kg (121 lb).    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  Her parents are alive and well, recently retired from a business selling parts for string instruments.  She has a younger sister with OCD, who now lives in [place] with her  and 5-week old nephew.  She reported good relationships with all family members.    She studied English at [employer] where  she received her bachelor's degree and then in 2017 obtained a PhD in literature from [employer].  She worked for the [employer] for a few years and then the [employer] since 2019 where she is a humanities .  She had worked for a [employer] company in [place] dealing with finances in between her degrees.  She is currently working full time, going in to the office one afternoon per week.  She recently attended two conferences a week apart and feels it took 10 days to recover.    She had been  for 12 years to her  Ramez.  He works for an [employer] start up, [email or web address].  She played the piano, studying for 7 years.   Since her second bleed has tried to return to playing a keyboard as a form of therapy.  She enjoysed playing games such as canasta, cribbage, backgammen with her  and friends.  She was not driving due to the visual problems.    Psychiatric History: Dr. Carranza obtained counseling during college and briefly thereafter on a group and individual basis to address some social anxiety and her existential dread.  She did not feel like she was actually feeling depressed at that time.  There is no history of psychiatric hospitalizations, suicide attempts or chemical dependency treatment.  Approximately 2 months ago, she began to take citalopram " per her primary care physician, Dr. Vela.  She stated that it helps to tamp down her pseudobulbar affect.  She was meditating twice per day, with the intention of it helping her to sleep better.    Psychological Screening: Dr.. Carranza was requested to complete the following screening measures:    PHQ-9 Score:  The Patient Health Questionnaire-9 is a depression screening instrument. Scores of 5, 10, 15, and 20 respectively indicate mild, moderate, moderately severe and severe depression symptoms.   PHQ-9 SCORE 8/19/2022 9/19/2022 11/19/2022   PHQ-9 Total Score MyChart 8 (Mild depression) 4 (Minimal depression) 7 (Mild depression)   PHQ-9 Total Score 8 4 7     BRIANNE-7 Score:  The General Anxiety Disorder-7 is an an anxiety screening instrument. Scores of 5, 10, and 15 respectively indicate mild, moderate, and severe anxiety symptoms.  BRIANNE-7 SCORE 1/21/2023 2/16/2023 3/22/2023   Total Score 0 (minimal anxiety) 0 (minimal anxiety) 2 (minimal anxiety)   Total Score 0 0 2     CAGE-AID Score: > 1 is a positive screen, suggesting further discussion is needed to determine if evaluation for alcohol or substance abuse is appropriate.  A score > 2 is considered clinically significant, suggesting further evaluation of alcohol or substance-related problems is indicated.  CAGE-AID Total Score 11/19/2022   Total Score 0   Total Score MyChart 0 (A total score of 2 or greater is considered clinically significant)     WHODAS-12 Score:  No flowsheet data found.    PROMIS-10  PROMIS 10 FLOWSHEET DATA 11/19/2022 2/16/2023   In general, would you say your health is: 3 3   In general, would you say your quality of life is: 3 4   In general, how would you rate your physical health? 3 3   In general, how would you rate your mental health, including your mood and your ability to think? 2 2   In general, how would you rate your satisfaction with your social activities and relationships? 3 4   In general, please rate how well you carry out  your usual social activities and roles. (This includes activities at home, at work and in your community, and responsibilities as a parent, child, spouse, employee, friend, etc.) 2 3   To what extent are you able to carry out your everyday physical activities such as walking, climbing stairs, carrying groceries, or moving a chair? 2 2   In the past 7 days, how often have you been bothered by emotional problems such as feeling anxious, depressed, or irritable? 2 2   In the past 7 days, how would you rate your fatigue on average? 4 4   In the past 7 days, how would you rate your pain on average, where 0 means no pain, and 10 means worst imaginable pain? 2 2   Mental health question re-calculation - no clinical value 4 4   Physical health question re-calculation - no clinical value 2 2   Pain question re-calculation - no clinical value 4 4   Global Mental Health Score 12 14   Global Physical Health Score 11 11   PROMIS TOTAL - SUBSCORES 23 25     The PROMIS-10 measures health-related quality of life and assesses overall health, fatigue, social health, mental health, and physical health.  Raw scores are converted to t-scores (average = 50, SD = 10). Lower t-scores indicate poorer health, higher t-scores indicate better health.   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 She had variable affect.  She has limited energy which is the hardest thing to accept.  We discussed self-acceptance of limitations related to health issues.  She found it helpful, recognizes the need to do it.  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      Discussed pacing further.  She feels it is helping.  Discussed options (e.g. < full-time position, taking breaks, meditating, butting out other stimulation like pleasure  reading)    Changing work habits.  She is resting at lunch, is taking a few minutes break per hour.    Struggling to control anxiety about a future bleed.  Discussed from ACT perspective.     She cut out coffee in afternoon.  It doesn't cut out sx, but it makes her less anxious in the evening.    We continued discussion of multiple aspects of coping with disability and chronic illness.  She appeared to find it helpful, recognizing that there are different perspectives she could develop that might help her become less frustrated and anxious.   She is looking forward to a trip to [place].  She is travelling with her .  Discussed ways to make it go easier, which she is doing.        She is seeing a hypnotist, Judith Promberg, for sleep and spasticity.  She thinks it helps.      Shared stress resources.    She participated fully and appeared to derive benefit.  Rapport was excellent.      Appearance/Behavior/Orientation: Alert and oriented  Cooperation/Reliability: Cooperative.  Cognition/Memory/Judgment: Not formally assessed, no difficulties noted. Excellent historian.  Speech/Language: Speech was clear, logical and coherent, mildly dysarthric and slow tempo.  She is self-conscious about it.  Thought Content/Form: Appropriate to interview and situation. Logical and organized.No psychosis.  Mood/Affect: Mood euthymic generally, with sadness at times. affect was mood congruent.    Insight/Motivation:  High  Suicide/Assault: She denies suicidal or assaultive ideation, plan, or intent.     Diagnosis:  Adjustment disorder with mixed anxiety and depressed mood    This telehealth service is appropriate and effective for delivering services in light of the necessity for social distancing to mitigate the COVID-19 epidemic and for conservation of PPE.     Patient has agreed to receiving telehealth services after being informed about it: Yes    Patient prefers video invitation/information to be sent by:   email    Time  service started: 3:03  Time service ended:  3:56  Extended session due to complexity of case and length of interval.    Mode of transmission: [email or web address]    Location of originating:  Home of the patient    Distance site:  Home office of provider for [facility]    The patient has been notified that:  Video visits will be conducted via a call with their psychologist to provide the care they need with a video conversation. Video visits may be billed at different rates depending on insurance coverage.  Patients are advised to please contact their insurance provider with any questions about their health insurance coverage. If during the course of a call the psychologist feels a video visit is not appropriate, patients will not be charged for this service.    Recommendations/Plan: Ms. Carranza will return for video visit 3/24 @ 3  to continue  problem-solving and supportive psychotherapy c/w treatment plan.    Bibliotherapy: Man's Search for Meaning and How to Get Out of Your Mind and Into Your LIfe.      Last treatment plan signed: 2/22/23       Treatment plan due:    2/22/24                           Larry Yates, Ph.D., A.B.P.P., L.P.  Director, Health Psychology

## 2023-04-28 ASSESSMENT — ANXIETY QUESTIONNAIRES
5. BEING SO RESTLESS THAT IT IS HARD TO SIT STILL: NOT AT ALL
7. FEELING AFRAID AS IF SOMETHING AWFUL MIGHT HAPPEN: NOT AT ALL
2. NOT BEING ABLE TO STOP OR CONTROL WORRYING: NOT AT ALL
3. WORRYING TOO MUCH ABOUT DIFFERENT THINGS: NOT AT ALL
6. BECOMING EASILY ANNOYED OR IRRITABLE: NOT AT ALL
7. FEELING AFRAID AS IF SOMETHING AWFUL MIGHT HAPPEN: NOT AT ALL
1. FEELING NERVOUS, ANXIOUS, OR ON EDGE: SEVERAL DAYS
8. IF YOU CHECKED OFF ANY PROBLEMS, HOW DIFFICULT HAVE THESE MADE IT FOR YOU TO DO YOUR WORK, TAKE CARE OF THINGS AT HOME, OR GET ALONG WITH OTHER PEOPLE?: SOMEWHAT DIFFICULT
GAD7 TOTAL SCORE: 2
4. TROUBLE RELAXING: SEVERAL DAYS
IF YOU CHECKED OFF ANY PROBLEMS ON THIS QUESTIONNAIRE, HOW DIFFICULT HAVE THESE PROBLEMS MADE IT FOR YOU TO DO YOUR WORK, TAKE CARE OF THINGS AT HOME, OR GET ALONG WITH OTHER PEOPLE: SOMEWHAT DIFFICULT
GAD7 TOTAL SCORE: 2

## 2023-05-05 ENCOUNTER — VIRTUAL VISIT (OUTPATIENT)
Dept: PSYCHOLOGY | Facility: CLINIC | Age: 43
End: 2023-05-05
Payer: COMMERCIAL

## 2023-05-05 DIAGNOSIS — F43.23 ADJUSTMENT DISORDER WITH MIXED ANXIETY AND DEPRESSED MOOD: Primary | ICD-10-CM

## 2023-05-05 PROCEDURE — 90837 PSYTX W PT 60 MINUTES: CPT | Mod: VID | Performed by: PSYCHOLOGIST

## 2023-05-05 NOTE — PROGRESS NOTES
Health Psychology                     Department of Medicine  Yari Avila, Ph.D., L.P. [phone]                          [facility] Stacey Nguyễn, Ph.D., L.P. [phone]                   [place] Mail [address]   RubyVan, Ph.D., L.P. [phone]       [address] [place] [address] Kathi Thompson, Ph.D., A.B.P.P., L.P. [phone]         [place] [address]          Larry Yates, Ph.D., A.B.P.P., L.P. [phone]     Charis Ruiz, Ph.D., L.P. [phone]  [facility]   Anna Das, Ph.D., A.B.P.P., L.P. [phone]  [address] [place] [address]     Health Psychology Telemental Health Progress Note    Referral Source:  Shawn Ullrich, LICSW,  John Vela M.D.    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  Since then, she has been anxious, fearful of having another bleed, and having some difficulty coping with the residual effects of the last bleed.    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  At that time, she was seen at [facility].  She had paresthesias in her left side and left hand afterwards, but no other effects.  At the beginning of the pandemic, she had a more extensive bleed of a cavernoma's hemangioma, requiring surgery at the [facility], with a 5-week hospitalization, including two weeks in the ICU.  She then had follow-up outpatient occupational therapy, physical therapy and speech therapy.  She reported the main symptoms are fatigue, pain in the left side of her face and fingers varying between a 0 and 7 on a 10 point subjective scale.  She also has left-sided weakness, needs to ambulate with either a walker or  power chair, and is slowed down.  She was frustrated that she is not able to do as much, feels guilty that her  is called upon to handle more activities at home.  She was mildly dysarthric, is now typing slowly.  She had noticed that her concentration is decreased, especially when fatigued.  She otherwise does not feel that there have been changes in her cognition or personality, though stated that she cried more easily due to pseudobulbar affect.    She reported that she was now anxious about having yet another bleed.  Since June, 2022 she had intermittent tingling on her right side.  It is similar to how it felt when she had her previous bleeds.  She stated that she was anxious about doing things and coping with the uncertainty about her future.   She acknowledged some guilt about the impact of her neurological changes on her marriage and disliked feeling dependent, or close to helpless about doing some things.    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  She does not use alcohol or illicit drugs.  She acknowledges drinking three  mugs of coffee per day.  She follows a pescatarian diet.  She is not currently getting OT, PT or speech therapy.  Exercise includes recumbent bike, Pilates machine, yoga and light free weights.  Her neurological care is through the [facility].  She has worked with the sleep team at [place].  Her primary care is at the [facility].  Her rehabilitation therapies have been at [facility].    Past Medical History:   Diagnosis Date     Cavernous hemangioma of brain (H)      Hypothyroid      No past surgical history on file.  Current Outpatient Medications   Medication     cetirizine (ZYRTEC) 10 MG tablet     citalopram (CELEXA) 10 MG tablet     HEMP OIL OR EXTRACT OR OTHER CBD CANNABINOID, NOT MEDICAL CANNABIS,     levothyroxine (SYNTHROID/LEVOTHROID) 100 MCG tablet     memantine XR (NAMENDA XR) 7 MG 24 hr capsule     No current facility-administered medications for this  "visit.     Wt Readings from Last 4 Encounters:   06/28/22 54.9 kg (121 lb)   01/22/20 53.6 kg (118 lb 4 oz)     Estimated body mass index is 18.86 kg/m  as calculated from the following:    Height as of 1/22/20: 1.706 m (5' 7.17\").    Weight as of 6/28/22: 54.9 kg (121 lb).    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  Her parents are alive and well, recently retired from a business selling parts for string instruments.  She has a younger sister with OCD, who now lives in [place] with her  and 5-week old nephew.  She reported good relationships with all family members.    She studied English at [employer] where  she received her bachelor's degree and then in 2017 obtained a PhD in literature from [employer].  She worked for the [employer] for a few years and then the [employer] since 2019 where she is a humanities .  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  She is currently working full time, going in to the office one afternoon per week.  She recently attended two conferences a week apart and feels it took 10 days to recover.    She had been  for 12 years to her  Ramez.  He works for an [employer] start up, [email or web address].  She played the piano, studying for 7 years.   Since her second bleed has tried to return to playing a keyboard as a form of therapy.  She enjoysed playing games such as canasta, cribbage, backgammen with her  and friends.  She was not driving due to the visual problems.    Psychiatric History: Dr. Carranza obtained counseling during college and briefly thereafter on a group and individual basis to address some social anxiety and her existential dread.  She did not feel like she was actually feeling depressed at that time.  There is no history of psychiatric hospitalizations, suicide attempts or chemical dependency treatment.  " Approximately 2 months ago, she began to take citalopram per her primary care physician, Dr. Vela.  She stated that it helps to tamp down her pseudobulbar affect.  She was meditating twice per day, with the intention of it helping her to sleep better. She has tried Melatonin for sleep.  CBD helps a little  She reports her  uses CPAP which interferes with her sleep.     Psychological Screening: Dr.. Carranza was requested to complete the following screening measures:    PHQ-9 Score:  The Patient Health Questionnaire-9 is a depression screening instrument. Scores of 5, 10, 15, and 20 respectively indicate mild, moderate, moderately severe and severe depression symptoms.       8/19/2022     4:04 PM 9/19/2022    10:25 AM 11/19/2022    11:53 AM   PHQ-9 SCORE   PHQ-9 Total Score MyChart 8 (Mild depression) 4 (Minimal depression) 7 (Mild depression)   PHQ-9 Total Score 8 4 7     BRIANNE-7 Score:  The General Anxiety Disorder-7 is an an anxiety screening instrument. Scores of 5, 10, and 15 respectively indicate mild, moderate, and severe anxiety symptoms.      2/16/2023     8:20 AM 3/22/2023     4:07 PM 4/28/2023     7:14 PM   BRIANNE-7 SCORE   Total Score 0 (minimal anxiety) 2 (minimal anxiety) 2 (minimal anxiety)   Total Score 0 2 2     CAGE-AID Score: > 1 is a positive screen, suggesting further discussion is needed to determine if evaluation for alcohol or substance abuse is appropriate.  A score > 2 is considered clinically significant, suggesting further evaluation of alcohol or substance-related problems is indicated.      11/19/2022    11:55 AM   CAGE-AID Total Score   Total Score 0   Total Score MyChart 0 (A total score of 2 or greater is considered clinically significant)     WHODAS-12 Score:       View : No data to display.                PROMIS-10      11/19/2022    11:55 AM 2/16/2023     8:21 AM   PROMIS 10 FLOWSHEET DATA   In general, would you say your health is: 3 3   In general, would you say your quality  of life is: 3 4   In general, how would you rate your physical health? 3 3   In general, how would you rate your mental health, including your mood and your ability to think? 2 2   In general, how would you rate your satisfaction with your social activities and relationships? 3 4   In general, please rate how well you carry out your usual social activities and roles. (This includes activities at home, at work and in your community, and responsibilities as a parent, child, spouse, employee, friend, etc.) 2 3   To what extent are you able to carry out your everyday physical activities such as walking, climbing stairs, carrying groceries, or moving a chair? 2 2   In the past 7 days, how often have you been bothered by emotional problems such as feeling anxious, depressed, or irritable? 2 2   In the past 7 days, how would you rate your fatigue on average? 4 4   In the past 7 days, how would you rate your pain on average, where 0 means no pain, and 10 means worst imaginable pain? 2 2   Mental health question re-calculation - no clinical value 4 4   Physical health question re-calculation - no clinical value 2 2   Pain question re-calculation - no clinical value 4 4   Global Mental Health Score 12 14   Global Physical Health Score 11 11   PROMIS TOTAL - SUBSCORES 23 25     The PROMIS-10 measures health-related quality of life and assesses overall health, fatigue, social health, mental health, and physical health.  Raw scores are converted to t-scores (average = 50, SD = 10). Lower t-scores indicate poorer health, higher t-scores indicate better health.   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 She had variable affect.  She has limited energy which is the hardest thing to accept.  We discussed self-acceptance of limitations related to health issues.   She found it helpful, recognizes the need to do it.  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      She did well at the 4 day conference in [place]. This was her third conference since her inujry.  She did well there, but was tired for a week after that.  She just met with people regular hours at the conference.  She took off the week after.   Discuss strategies for daeling with trips, interactions, and the recovery period  Discussed cutting out coffee for a few days after the trip.    Struggling to control anxiety about a future bleed.  Discussed from ACT perspective. Finding ACT to be helpful.      She cut out coffee in afternoon.  It doesn't cut out sx, but it makes her less anxious in the evening.  She never has coffee after 10 AM.   Discussed  Cutting out coffee in days after trips so as to maximize sleep.     We continued discussion of multiple aspects of coping with disability and chronic illness.  She appeared to find it helpful, recognizing that there are different perspectives she could develop that might help her become less frustrated and anxious.      She saw a hypnotist, Judith Promberg, for sleep and spasticity x1/month.  She thinks it helps.   She is meditating, which helps with energy.  The CBD oil helps.  She rarely sleep less than 5 hours.  Usually 5-9 hours; average is 7.  8-9 is high.   Was 6 during the week back.     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    She participated fully and appeared to derive benefit.  Rapport was excellent.    Appearance/Behavior/Orientation: Alert and oriented  Cooperation/Reliability: Cooperative.  Cognition/Memory/Judgment: Not formally assessed, no difficulties noted. Excellent historian.  Speech/Language: Speech was clear, logical and coherent, mildly dysarthric and slow tempo.  She is self-conscious about it.  Thought Content/Form: Appropriate to interview and  situation. Logical and organized.No psychosis.  Mood/Affect: Mood euthymic generally, brighter. Affect was mood congruent.    Insight/Motivation:  High  Suicide/Assault: No safety issues.    Diagnosis:  Adjustment disorder with mixed anxiety and depressed mood    This telehealth service is appropriate and effective for delivering services in light of the necessity for social distancing to mitigate the COVID-19 epidemic and for conservation of PPE.     Patient has agreed to receiving telehealth services after being informed about it: Yes    Patient prefers video invitation/information to be sent by:   email    Time service started: 3:03  Time service ended:  3:58  Extended session due to complexity of case and length of interval.    Mode of transmission: [email or web address]    Location of originating:  Home of the patient    Distance site:  Home office of provider for [facility]    The patient has been notified that:  Video visits will be conducted via a call with their psychologist to provide the care they need with a video conversation. Video visits may be billed at different rates depending on insurance coverage.  Patients are advised to please contact their insurance provider with any questions about their health insurance coverage. If during the course of a call the psychologist feels a video visit is not appropriate, patients will not be charged for this service.    Recommendations/Plan: Ms. Carranza will return for video visit 6/20  @ 3  to continue  problem-solving and supportive psychotherapy c/w treatment plan.  Will use [facility] next session.    Bibliotherapy: Man's Search for Meaning and How to Get Out of Your Mind and Into Your LIfe.    Last treatment plan signed: 2/22/23       Treatment plan due:    2/22/24                           Larry Yates, Ph.D., A.B.P.P., L.P.  Director, Health Psychology

## 2023-06-15 ASSESSMENT — ANXIETY QUESTIONNAIRES
5. BEING SO RESTLESS THAT IT IS HARD TO SIT STILL: NOT AT ALL
GAD7 TOTAL SCORE: 0
7. FEELING AFRAID AS IF SOMETHING AWFUL MIGHT HAPPEN: NOT AT ALL
1. FEELING NERVOUS, ANXIOUS, OR ON EDGE: NOT AT ALL
GAD7 TOTAL SCORE: 0
GAD7 TOTAL SCORE: 0
6. BECOMING EASILY ANNOYED OR IRRITABLE: NOT AT ALL
3. WORRYING TOO MUCH ABOUT DIFFERENT THINGS: NOT AT ALL
2. NOT BEING ABLE TO STOP OR CONTROL WORRYING: NOT AT ALL
7. FEELING AFRAID AS IF SOMETHING AWFUL MIGHT HAPPEN: NOT AT ALL
4. TROUBLE RELAXING: NOT AT ALL

## 2023-06-20 ENCOUNTER — VIRTUAL VISIT (OUTPATIENT)
Dept: PSYCHOLOGY | Facility: CLINIC | Age: 43
End: 2023-06-20
Payer: COMMERCIAL

## 2023-06-20 DIAGNOSIS — F43.23 ADJUSTMENT DISORDER WITH MIXED ANXIETY AND DEPRESSED MOOD: Primary | ICD-10-CM

## 2023-06-20 PROCEDURE — 90837 PSYTX W PT 60 MINUTES: CPT | Mod: VID | Performed by: PSYCHOLOGIST

## 2023-06-20 NOTE — PROGRESS NOTES
Health Psychology                     Department of Medicine  Yari Avila, Ph.D., L.P. [phone]                          [facility] Stacey Nguyễn, Ph.D., L.P. [phone]                   [place] Mail Code 725   RubyVan, Ph.D., L.P. [phone]       [address] [place] [address] Kathi Thompson, Ph.D., A.B.P.P., L.P. [phone]         [place] [address]          Larry Yates, Ph.D., A.B.P.P., L.P. [phone]     Charis Ruiz, Ph.D., L.P. [phone]  [facility]   Anna Das, Ph.D., A.B.P.P., L.P. [phone]  [address] [place] [address]     Health Psychology Telemental Health Progress Note    Referral Source:  Shawn Ullrich, LICSW,  John Vela M.D.    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  Since then, she has been anxious, fearful of having another bleed, and having some difficulty coping with the residual effects of the last bleed.    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  At that time, she was seen at [facility].  She had paresthesias in her left side and left hand afterwards, but no other effects.  At the beginning of the pandemic, she had a more extensive bleed of a cavernoma's hemangioma, requiring surgery at the [facility], with a 5-week hospitalization, including two weeks in the ICU.  She then had follow-up outpatient occupational therapy, physical therapy and speech therapy.  She reported the main symptoms are fatigue, pain in the left side of her face and fingers varying between a 0 and 7 on a 10 point subjective scale.  She also has left-sided weakness, needs to ambulate with either a walker or  power chair, and is slowed down.  She was frustrated that she is not able to do as much, feels guilty that her  is called upon to handle more activities at home.  She was mildly dysarthric, is now typing slowly.  She had noticed that her concentration is decreased, especially when fatigued.  She otherwise does not feel that there have been changes in her cognition or personality, though stated that she cried more easily due to pseudobulbar affect.    She reported that she was now anxious about having yet another bleed.  Since June, 2022 she had intermittent tingling on her right side.  It is similar to how it felt when she had her previous bleeds.  She stated that she was anxious about doing things and coping with the uncertainty about her future.   She acknowledged some guilt about the impact of her neurological changes on her marriage and disliked feeling dependent, or close to helpless about doing some things.    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  She does not use alcohol or illicit drugs.  She acknowledges drinking three  mugs of coffee per day.  She follows a pescatarian diet.  She is not currently getting OT, PT or speech therapy.  Exercise includes recumbent bike, Pilates machine, yoga and light free weights.  Her neurological care is through the [facility].  She has worked with the sleep team at [place].  Her primary care is at the [facility].  Her rehabilitation therapies have been at [facility].    Past Medical History:   Diagnosis Date     Cavernous hemangioma of brain (H)      Hypothyroid      No past surgical history on file.  Current Outpatient Medications   Medication     cetirizine (ZYRTEC) 10 MG tablet     citalopram (CELEXA) 10 MG tablet     HEMP OIL OR EXTRACT OR OTHER CBD CANNABINOID, NOT MEDICAL CANNABIS,     levothyroxine (SYNTHROID/LEVOTHROID) 100 MCG tablet     memantine XR (NAMENDA XR) 7 MG 24 hr capsule     No current facility-administered  "medications for this visit.     Wt Readings from Last 4 Encounters:   06/28/22 54.9 kg (121 lb)   01/22/20 53.6 kg (118 lb 4 oz)     Estimated body mass index is 18.86 kg/m  as calculated from the following:    Height as of 1/22/20: 1.706 m (5' 7.17\").    Weight as of 6/28/22: 54.9 kg (121 lb).    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  Her parents are alive and well, recently retired from a business selling parts for string instruments.  She has a younger sister with OCD, who now lives in [place] with her  and 5-week old nephew.  She reported good relationships with all family members.    She studied English at [employer] where  she received her bachelor's degree and then in 2017 obtained a PhD in literature from [employer].  She worked for the [employer] for a few years and then the [employer] since 2019 where she is a humanities .  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  She is currently working full time, going in to the office one afternoon per week.  She recently attended two conferences a week apart and feels it took 10 days to recover.    She had been  for 12 years to her  Ramez.  He works for an [employer] start up, [email or web address].  She played the piano, studying for 7 years.   Since her second bleed has tried to return to playing a keyboard as a form of therapy.  She enjoysed playing games such as canasta, cribbage, backgammen with her  and friends.  She was not driving due to the visual problems.    Psychiatric History: Dr. Carranza obtained counseling during college and briefly thereafter on a group and individual basis to address some social anxiety and her existential dread.  She did not feel like she was actually feeling depressed at that time.  There is no history of psychiatric hospitalizations, suicide attempts or chemical dependency " treatment.  Approximately 2 months ago, she began to take citalopram per her primary care physician, Dr. Vela.  She stated that it helps to tamp down her pseudobulbar affect.  She was meditating twice per day, with the intention of it helping her to sleep better. She has tried Melatonin for sleep. CBD helps a little  She reports her  uses CPAP which interferes with her sleep.     Psychological Screening: Dr.. Carranza was requested to complete the following screening measures:    PHQ-9 Score:  The Patient Health Questionnaire-9 is a depression screening instrument. Scores of 5, 10, 15, and 20 respectively indicate mild, moderate, moderately severe and severe depression symptoms.       8/19/2022     4:04 PM 9/19/2022    10:25 AM 11/19/2022    11:53 AM   PHQ-9 SCORE   PHQ-9 Total Score MyChart 8 (Mild depression) 4 (Minimal depression) 7 (Mild depression)   PHQ-9 Total Score 8 4 7     BRIANNE-7 Score:  The General Anxiety Disorder-7 is an an anxiety screening instrument. Scores of 5, 10, and 15 respectively indicate mild, moderate, and severe anxiety symptoms.      3/22/2023     4:07 PM 4/28/2023     7:14 PM 6/15/2023     2:04 PM   BRIANNE-7 SCORE   Total Score 2 (minimal anxiety) 2 (minimal anxiety) 0 (minimal anxiety)   Total Score 2 2 0     CAGE-AID Score: > 1 is a positive screen, suggesting further discussion is needed to determine if evaluation for alcohol or substance abuse is appropriate.  A score > 2 is considered clinically significant, suggesting further evaluation of alcohol or substance-related problems is indicated.      11/19/2022    11:55 AM   CAGE-AID Total Score   Total Score 0   Total Score MyChart 0 (A total score of 2 or greater is considered clinically significant)     WHODAS-12 Score:       View : No data to display.                PROMIS-10      11/19/2022    11:55 AM 2/16/2023     8:21 AM 6/15/2023     2:06 PM   PROMIS 10 FLOWSHEET DATA   In general, would you say your health is: 3 3 3   In  general, would you say your quality of life is: 3 4 4   In general, how would you rate your physical health? 3 3 3   In general, how would you rate your mental health, including your mood and your ability to think? 2 2 3   In general, how would you rate your satisfaction with your social activities and relationships? 3 4 3   In general, please rate how well you carry out your usual social activities and roles. (This includes activities at home, at work and in your community, and responsibilities as a parent, child, spouse, employee, friend, etc.) 2 3 2   To what extent are you able to carry out your everyday physical activities such as walking, climbing stairs, carrying groceries, or moving a chair? 2 2 3   In the past 7 days, how often have you been bothered by emotional problems such as feeling anxious, depressed, or irritable? 2 2 2   In the past 7 days, how would you rate your fatigue on average? 4 4 4   In the past 7 days, how would you rate your pain on average, where 0 means no pain, and 10 means worst imaginable pain? 2 2 2   Mental health question re-calculation - no clinical value 4 4 4   Physical health question re-calculation - no clinical value 2 2 2   Pain question re-calculation - no clinical value 4 4 4   Global Mental Health Score 12 14 14   Global Physical Health Score 11 11 12   PROMIS TOTAL - SUBSCORES 23 25 26     The PROMIS-10 measures health-related quality of life and assesses overall health, fatigue, social health, mental health, and physical health.  Raw scores are converted to t-scores (average = 50, SD = 10). Lower t-scores indicate poorer health, higher t-scores indicate better health.   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 She had variable affect.  She has limited energy which is the hardest thing to accept.  We  discussed self-acceptance of limitations related to health issues.  She found it helpful, recognizes the need to do it.  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      Fatigue and sleep are her main hurdles.     She did well at the 4 day conference in [place]. This was her third conference since her inujry.  She did well there, but was tired for a week after that.  She just met with people regular hours at the conference.  She took off the week after.   Discuss strategies for daeling with trips, interactions, and the recovery period  Discussed cutting out coffee for a few days after the trip.    Struggling to control anxiety about a future bleed.  Discussed from ACT perspective. Finding ACT to be helpful.      Had a sleep study. Don't have central apnea.   Falls back asleep better. Insomnia continues.  If she doesn't take CBD, even if taking  It, 1/week.  She wants to fall asleep before she does.  It takes a lot of time to fall asleep.  It is as if brain forgot how to switch from awake to asleep. Days of low activity, sleeps well.  Days more going on, doesn't sleep as well.  Can't find the off switch.  She isn't anxious or wound up.   It can take 2 hours togfall asleep; maybe 4-5/week. Once or week get four hours or less.   Most nights, she gets 6,5  Hours.  She asked about resources in [place].   She was wondering about how she can sleep more.   She has good sleep hygiene. She goes to bed around 9>:30; reads then.  Discussed conditioned responses to help her fall asleep and avoidance of acivating activities. She stopped seeing the hypnotist, Judith Fromberg, for sleep and spasticity x1/month.  She thinks it helps.   She is meditating, which helps with energy.  The CBD oil helps.  She rarely sleep less than 5 hours.  Usually 5-9 hours; average is 7.  8-9 is high.      She had a good experience at her family cabin.   She is somewhat anxious about upcoming trip  to [place]  For party for her dissertation advisor.  She sent people notice she has had her health problems.     She cut out coffee in afternoon.  It doesn't cut out sx, but it makes her less anxious in the evening.  She never has coffee after 10 AM.   Discussed cutting out coffee in days after trips so as to maximize sleep.     We continued discussion of multiple aspects of coping with disability and chronic illness.  She appeared to find it helpful, recognizing that there are different perspectives she could develop that might help her become less frustrated and anxious.      k.     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    She participated fully and appeared to derive benefit.  Rapport was excellent.    Appearance/Behavior/Orientation: Alert and oriented  Cooperation/Reliability: Cooperative.  Cognition/Memory/Judgment: Not formally assessed, no difficulties noted. Excellent historian.  Speech/Language: Speech was clear, logical and coherent, mildly dysarthric and slow tempo.  She is self-conscious about it.  Thought Content/Form: Appropriate to interview and situation. Logical and organized.No psychosis.  Mood/Affect: Mood euthymic generally, brighter. Affect was mood congruent.    Insight/Motivation:  High  Suicide/Assault: No safety issues.    Diagnosis:  Adjustment disorder with mixed anxiety and depressed mood    This telehealth service is appropriate and effective for delivering services in light of the necessity for social distancing to mitigate the COVID-19 epidemic and for conservation of PPE.     Patient has agreed to receiving telehealth services after being informed about it: Yes    Patient prefers video invitation/information to be sent by:   email    Time service started: 3:03  Time service ended:  3:58  Extended session due to complexity of case and length of interval.    Mode of transmission: Crowdzu    Location of originating:  Home of the patient    Distance site:   Home office of provider for [facility]    The patient has been notified that:  Video visits will be conducted via a call with their psychologist to provide the care they need with a video conversation. Video visits may be billed at different rates depending on insurance coverage.  Patients are advised to please contact their insurance provider with any questions about their health insurance coverage. If during the course of a call the psychologist feels a video visit is not appropriate, patients will not be charged for this service.    Recommendations/Plan: Ms. Carranza will return for video visit 8/8 @ 4  to continue  problem-solving and supportive psychotherapy c/w treatment plan.       Last treatment plan signed: 2/22/23       Treatment plan due:    2/22/24                           Larry Yates, Ph.D., A.B.P.P., L.P.  Director, Health Psychology

## 2023-07-30 ENCOUNTER — HEALTH MAINTENANCE LETTER (OUTPATIENT)
Age: 43
End: 2023-07-30

## 2023-08-02 ASSESSMENT — ANXIETY QUESTIONNAIRES
GAD7 TOTAL SCORE: 2
7. FEELING AFRAID AS IF SOMETHING AWFUL MIGHT HAPPEN: NOT AT ALL
4. TROUBLE RELAXING: SEVERAL DAYS
6. BECOMING EASILY ANNOYED OR IRRITABLE: NOT AT ALL
GAD7 TOTAL SCORE: 2
5. BEING SO RESTLESS THAT IT IS HARD TO SIT STILL: NOT AT ALL
IF YOU CHECKED OFF ANY PROBLEMS ON THIS QUESTIONNAIRE, HOW DIFFICULT HAVE THESE PROBLEMS MADE IT FOR YOU TO DO YOUR WORK, TAKE CARE OF THINGS AT HOME, OR GET ALONG WITH OTHER PEOPLE: SOMEWHAT DIFFICULT
3. WORRYING TOO MUCH ABOUT DIFFERENT THINGS: NOT AT ALL
2. NOT BEING ABLE TO STOP OR CONTROL WORRYING: NOT AT ALL
1. FEELING NERVOUS, ANXIOUS, OR ON EDGE: SEVERAL DAYS

## 2023-08-08 ENCOUNTER — VIRTUAL VISIT (OUTPATIENT)
Dept: PSYCHOLOGY | Facility: CLINIC | Age: 43
End: 2023-08-08
Payer: COMMERCIAL

## 2023-08-08 DIAGNOSIS — F43.23 ADJUSTMENT DISORDER WITH MIXED ANXIETY AND DEPRESSED MOOD: Primary | ICD-10-CM

## 2023-08-08 PROCEDURE — 90837 PSYTX W PT 60 MINUTES: CPT | Mod: VID | Performed by: PSYCHOLOGIST

## 2023-08-08 NOTE — PROGRESS NOTES
Health Psychology                     Department of Medicine  Yari Avila, Ph.D., L.P. [phone]                          [facility] Stacey Nguyễn, Ph.D., L.P. [phone]                   [place] [address]   RubyVan, Ph.D., L.P. [phone]       [address] [place] [address] Kathi Thompson, Ph.D., A.B.P.P., L.P. [phone]         [place] [address]          Larry Yates, Ph.D., A.B.P.P., L.P. [phone]     Charis Ruiz, Ph.D., L.P. [phone]  [facility]   Anna Das, Ph.D., A.B.P.P., L.P. [phone]  [address] [place] [address]     Health Psychology Telemental Health Progress Note    Referral Source:  Shawn Ullrich, LICSW,  John Vela M.D.    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  Since then, she has been anxious, fearful of having another bleed, and having some difficulty coping with the residual effects of the last bleed.    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  At that time, she was seen at [facility].  She had paresthesias in her left side and left hand afterwards, but no other effects.  At the beginning of the pandemic, she had a more extensive bleed of a cavernoma's hemangioma, requiring surgery at the [facility], with a 5-week hospitalization, including two weeks in the ICU.  She then had follow-up outpatient occupational therapy, physical therapy and speech therapy.  She reported the main symptoms are fatigue, pain in the left side of her face and fingers varying between a 0 and 7 on a 10 point subjective scale.  She also has left-sided weakness, needs to ambulate with either a walker or  power chair, and is slowed down.  She was frustrated that she is not able to do as much, feels guilty that her  is called upon to handle more activities at home.  She was mildly dysarthric, is now typing slowly.  She had noticed that her concentration is decreased, especially when fatigued.  She otherwise does not feel that there have been changes in her cognition or personality, though stated that she cried more easily due to pseudobulbar affect.    She reported that she was now anxious about having yet another bleed.  Since June, 2022 she had intermittent tingling on her right side.  It is similar to how it felt when she had her previous bleeds.  She stated that she was anxious about doing things and coping with the uncertainty about her future.   She acknowledged some guilt about the impact of her neurological changes on her marriage and disliked feeling dependent, or close to helpless about doing some things.    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  She does not use alcohol or illicit drugs.  She acknowledges drinking three  mugs of coffee per day.  She follows a pescatarian diet.  She is not currently getting OT, PT or speech therapy.  Exercise includes recumbent bike, Pilates machine, yoga and light free weights.  Her neurological care is through the [facility].  She has worked with the sleep team at [facility].  Her primary care is at the [facility].  Her rehabilitation therapies have been at [facility].    Past Medical History:   Diagnosis Date    Cavernous hemangioma of brain (H)     Hypothyroid      No past surgical history on file.  Current Outpatient Medications   Medication    cetirizine (ZYRTEC) 10 MG tablet    citalopram (CELEXA) 10 MG tablet    HEMP OIL OR EXTRACT OR OTHER CBD CANNABINOID, NOT MEDICAL CANNABIS,    levothyroxine (SYNTHROID/LEVOTHROID) 100 MCG tablet    memantine XR (NAMENDA XR) 7 MG 24 hr capsule     No current facility-administered medications for  "this visit.     Wt Readings from Last 4 Encounters:   06/28/22 54.9 kg (121 lb)   01/22/20 53.6 kg (118 lb 4 oz)     Estimated body mass index is 18.86 kg/m  as calculated from the following:    Height as of 1/22/20: 1.706 m (5' 7.17\").    Weight as of 6/28/22: 54.9 kg (121 lb).    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  Her parents are alive and well, recently retired from a business selling parts for string instruments.  She has a younger sister with OCD, who now lives in [place] with her  and 5-week old nephew.  She reported good relationships with all family members.    She studied English at [employer] where  she received her bachelor's degree and then in 2017 obtained a PhD in literature from [employer].  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  She is currently working full time, going in to the office one afternoon per week.  She recently attended two conferences a week apart and feels it took 10 days to recover.    She had been  for 12 years to her  Ramez.  He works for an [employer] start up, [email or web address].  She played the piano, studying for 7 years.   Since her second bleed has tried to return to playing a keyboard as a form of therapy.  She enjoysed playing games such as canasta, cribbage, backgammen with her  and friends.  She was not driving due to the visual problems.    Psychiatric History: Dr. Carranza obtained counseling during college and briefly thereafter on a group and individual basis to address some social anxiety and her existential dread.  She did not feel like she was actually feeling depressed at that time.  There is no history of psychiatric hospitalizations, suicide attempts or chemical dependency treatment.  " Approximately 2 months ago, she began to take citalopram per her primary care physician, Dr. Vela.  She stated that it helps to tamp down her pseudobulbar affect.  She was meditating twice per day, with the intention of it helping her to sleep better. She has tried Melatonin for sleep. CBD helps a little  She reports her  uses CPAP which interferes with her sleep.     Psychological Screening: Dr.. Carranza was requested to complete the following screening measures:    PHQ-9 Score:  The Patient Health Questionnaire-9 is a depression screening instrument. Scores of 5, 10, 15, and 20 respectively indicate mild, moderate, moderately severe and severe depression symptoms.       8/19/2022     4:04 PM 9/19/2022    10:25 AM 11/19/2022    11:53 AM   PHQ-9 SCORE   PHQ-9 Total Score MyChart 8 (Mild depression) 4 (Minimal depression) 7 (Mild depression)   PHQ-9 Total Score 8 4 7     BRIANNE-7 Score:  The General Anxiety Disorder-7 is an an anxiety screening instrument. Scores of 5, 10, and 15 respectively indicate mild, moderate, and severe anxiety symptoms.      4/28/2023     7:14 PM 6/15/2023     2:04 PM 8/2/2023     8:17 AM   BRIANNE-7 SCORE   Total Score 2 (minimal anxiety) 0 (minimal anxiety) 2 (minimal anxiety)   Total Score 2 0 2     CAGE-AID Score: > 1 is a positive screen, suggesting further discussion is needed to determine if evaluation for alcohol or substance abuse is appropriate.  A score > 2 is considered clinically significant, suggesting further evaluation of alcohol or substance-related problems is indicated.      11/19/2022    11:55 AM   CAGE-AID Total Score   Total Score 0   Total Score MyChart 0 (A total score of 2 or greater is considered clinically significant)     WHODAS-12 Score:       No data to display                PROMIS-10      11/19/2022    11:55 AM 2/16/2023     8:21 AM 6/15/2023     2:06 PM   PROMIS 10 FLOWSHEET DATA   In general, would you say your health is: 3 3 3   In general, would you  say your quality of life is: 3 4 4   In general, how would you rate your physical health? 3 3 3   In general, how would you rate your mental health, including your mood and your ability to think? 2 2 3   In general, how would you rate your satisfaction with your social activities and relationships? 3 4 3   In general, please rate how well you carry out your usual social activities and roles. (This includes activities at home, at work and in your community, and responsibilities as a parent, child, spouse, employee, friend, etc.) 2 3 2   To what extent are you able to carry out your everyday physical activities such as walking, climbing stairs, carrying groceries, or moving a chair? 2 2 3   In the past 7 days, how often have you been bothered by emotional problems such as feeling anxious, depressed, or irritable? 2 2 2   In the past 7 days, how would you rate your fatigue on average? 4 4 4   In the past 7 days, how would you rate your pain on average, where 0 means no pain, and 10 means worst imaginable pain? 2 2 2   Mental health question re-calculation - no clinical value 4 4 4   Physical health question re-calculation - no clinical value 2 2 2   Pain question re-calculation - no clinical value 4 4 4   Global Mental Health Score 12 14 14   Global Physical Health Score 11 11 12   PROMIS TOTAL - SUBSCORES 23 25 26     The PROMIS-10 measures health-related quality of life and assesses overall health, fatigue, social health, mental health, and physical health.  Raw scores are converted to t-scores (average = 50, SD = 10). Lower t-scores indicate poorer health, higher t-scores indicate better health.   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 She had variable affect.  She has limited energy which is the hardest thing to accept.    Fatigue and sleep are  her main hurdles.   Sleep has improved , using distraction.  Also, she finds that if she doesn't overstress herself, she sleeps better, whereas otherwise she can undermine her sleep for a number of days.    She went to a [facility] for her advisor.  It went well. She was accepted by her former fellow students.  She managed her stress well, leaving early so as not to get over-tired.    Today, her main concern is  an upcoming MRI.  She has wondered whether to retire if the atypical vasculature is found.  Discussed her goals of finding fulfillment.       She is meditating, which helps with energy.  The CBD oil helps with her sleep.  She rarely sleep less than 5 hours.  Usually 5-9 hours; average is 7.  8-9 is high.      We continued discussion of multiple aspects of coping with disability and chronic illness.  She appeared to find it helpful, recognizing that there are different perspectives she could develop that might help her become less frustrated and anxious.      She feels she is now doing better, has learned more about how to live her life fully.   At this point, she doesn't have major worries and feels it is a good stopping point in our meetings given her progress.    She participated fully and appeared to derive benefit.  Rapport was excellent.    Appearance/Behavior/Orientation: Alert and oriented  Cooperation/Reliability: Cooperative.  Cognition/Memory/Judgment: Not formally assessed, no difficulties noted. Excellent historian.  Speech/Language: Speech was clear, logical and coherent, mildly dysarthric and slow tempo.  She is self-conscious about it.  Thought Content/Form: Appropriate to interview and situation. Logical and organized.No psychosis.  Mood/Affect: Mood euthymic generally, brighter. Affect was mood congruent.    Insight/Motivation:  High  Suicide/Assault: No safety issues.    Diagnosis:  Adjustment disorder with mixed anxiety and depressed mood    This telehealth service is appropriate and  effective for delivering services in light of the necessity for social distancing to mitigate the COVID-19 epidemic and for conservation of PPE.     Patient has agreed to receiving telehealth services after being informed about it: Yes    Patient prefers video invitation/information to be sent by:   email    Time service started: 4:01  Time service ended:  5:00  Extended session due to complexity of case and length of interval.    Mode of transmission: [employer]    Location of originating:  Home of the patient    Distance site:  [facility]    The patient has been notified that:  Video visits will be conducted via a call with their psychologist to provide the care they need with a video conversation. Video visits may be billed at different rates depending on insurance coverage.  Patients are advised to please contact their insurance provider with any questions about their health insurance coverage. If during the course of a call the psychologist feels a video visit is not appropriate, patients will not be charged for this service.    Recommendations/Plan: Ms. Carranza will  contact me if wishing to return for future consultations.  She is aware she can return.No f/unit(s) planned at this point.    Last treatment plan signed: 2/22/23       Treatment plan due:    2/22/24                           Larry Yates, Ph.D., A.B.P.P., L.P.  Director, Health Psychology

## 2023-10-12 DIAGNOSIS — F41.9 ANXIETY: ICD-10-CM

## 2023-10-12 RX ORDER — CITALOPRAM HYDROBROMIDE 10 MG/1
10 TABLET ORAL DAILY
Qty: 30 TABLET | Refills: 0 | Status: SHIPPED | OUTPATIENT
Start: 2023-10-12 | End: 2023-10-25

## 2023-10-12 NOTE — TELEPHONE ENCOUNTER
Medication requested: citalopram (CELEXA) 10 MG tablet   Last office visit: 6/28/22  [facility] appointments: none  Medication last refilled: 9/19/22; 90 + 3 refills  Last qualifying labs:    8/2/2023  8:17 AM   PHQ-9 / BRIANNE-7 Scores 8/2015 to present    BRIANNE-7 Score DocFlow 2      Medication is being filled for 1 time refill only due to:  Patient needs to be seen because it has been more than one year since last visit.    Routed to  to contact pt to schedule appt.    Wero ESPINAL, RN  10/12/23 10:20 AM

## 2023-10-25 ENCOUNTER — VIRTUAL VISIT (OUTPATIENT)
Dept: FAMILY MEDICINE | Facility: CLINIC | Age: 43
End: 2023-10-25
Payer: COMMERCIAL

## 2023-10-25 DIAGNOSIS — F41.9 ANXIETY: Primary | ICD-10-CM

## 2023-10-25 RX ORDER — CLONAZEPAM 0.5 MG/1
0.25 TABLET ORAL 2 TIMES DAILY PRN
Qty: 15 TABLET | Refills: 1 | Status: SHIPPED | OUTPATIENT
Start: 2023-10-25

## 2023-10-25 RX ORDER — CITALOPRAM HYDROBROMIDE 10 MG/1
10 TABLET ORAL DAILY
Qty: 90 TABLET | Refills: 4 | Status: SHIPPED | OUTPATIENT
Start: 2023-10-25

## 2023-10-25 ASSESSMENT — ANXIETY QUESTIONNAIRES
3. WORRYING TOO MUCH ABOUT DIFFERENT THINGS: NOT AT ALL
GAD7 TOTAL SCORE: 1
2. NOT BEING ABLE TO STOP OR CONTROL WORRYING: NOT AT ALL
6. BECOMING EASILY ANNOYED OR IRRITABLE: NOT AT ALL
5. BEING SO RESTLESS THAT IT IS HARD TO SIT STILL: NOT AT ALL
7. FEELING AFRAID AS IF SOMETHING AWFUL MIGHT HAPPEN: NOT AT ALL
1. FEELING NERVOUS, ANXIOUS, OR ON EDGE: SEVERAL DAYS
GAD7 TOTAL SCORE: 1
4. TROUBLE RELAXING: NOT AT ALL
IF YOU CHECKED OFF ANY PROBLEMS ON THIS QUESTIONNAIRE, HOW DIFFICULT HAVE THESE PROBLEMS MADE IT FOR YOU TO DO YOUR WORK, TAKE CARE OF THINGS AT HOME, OR GET ALONG WITH OTHER PEOPLE: SOMEWHAT DIFFICULT

## 2023-10-25 ASSESSMENT — PATIENT HEALTH QUESTIONNAIRE - PHQ9
SUM OF ALL RESPONSES TO PHQ QUESTIONS 1-9: 5
10. IF YOU CHECKED OFF ANY PROBLEMS, HOW DIFFICULT HAVE THESE PROBLEMS MADE IT FOR YOU TO DO YOUR WORK, TAKE CARE OF THINGS AT HOME, OR GET ALONG WITH OTHER PEOPLE: SOMEWHAT DIFFICULT
SUM OF ALL RESPONSES TO PHQ QUESTIONS 1-9: 5

## 2023-10-25 NOTE — PROGRESS NOTES
Lindsay is a 43 year old who is being evaluated via a billable video visit.      How would you like to obtain your AVS? MyChart  If the video visit is dropped, the invitation should be resent by: Text to cell phone: [phone]  Will anyone else be joining your video visit? No    Assessment & Plan     Anxiety  The current medical regimen is effective;  continue present plan and medications. Also requests limited supply of clonazepam which she has used in the past for acute anxiety attacks as needed. One 15 tab supply she expects will last at least a year. PDMP reviewed and Rx filled. I discussed with the patient the risks, benefits, and alternatives to taking this medication as well as common side effects.   - citalopram (CELEXA) 10 MG tablet; Take 1 tablet (10 mg) by mouth daily  - clonazePAM (KLONOPIN) 0.5 MG tablet; Take 0.5 tablets (0.25 mg) by mouth 2 times daily as needed for anxiety    Return in about 1 year (around 10/25/2024). Sooner PRN.    Brionna Pope MD   [facility]    Subjective   Lindsay is a 43 year old with cavernous hemangioma presenting for the following health issues:  Mental Health (Follow up )    HPI     Anxiety. Long-standing diagnosis.   - Medications: Has been treating with citalopram 10 mg daily since 8/2022. No adverse medication effects. Helps with 85% of her pseudobulbar affect which affects her mood a lot.   - More sensitive to alcohol since starting citalopram, but almost never drinks.   - Uses CBD.   - Counseling: Patient is not currently seeing a therapist/counselor. But has in the past. Feels pretty good about where she's at right now.   - Patient denies changes in mood or affect, anhedonia, appetite disturbance, concentration difficulty, delusions, hallucinations, suicidal ideation, self-harm.   - Past medication trials: clonazepam 1/2 tablet as needed. About 2-3x/month.      Objective           Vitals:  No vitals were obtained today due to virtual  visit.    Physical Exam   GENERAL: Healthy, alert and no distress  EYES: Eyes grossly normal to inspection.  No discharge or erythema, or obvious scleral/conjunctival abnormalities.  RESP: No audible wheeze, cough, or visible cyanosis.  No visible retractions or increased work of breathing.    SKIN: Visible skin clear. No significant rash, abnormal pigmentation or lesions.  PSYCH: Mentation appears normal, affect normal/bright, judgement and insight intact, normal speech and appearance well-groomed.        PDMP Review         Value Time User    State PDMP site checked  Yes 10/25/2023 11:01 AM Brionna Pope MD            Video-Visit Details  Type of service:  Video Visit   Originating Location (pt. Location): Home  Distant Location (provider location):  On-site  Platform used for Video Visit: [employer] (InsideTracker)    Answers submitted by the patient for this visit:  Patient Health Questionnaire (Submitted on 10/25/2023)  If you checked off any problems, how difficult have these problems made it for you to do your work, take care of things at home, or get along with other people?: Somewhat difficult  PHQ9 TOTAL SCORE: 5  BRIANNE-7 (Submitted on 10/25/2023)  BRIANNE 7 TOTAL SCORE: 1

## 2024-08-28 ENCOUNTER — ONCOLOGY VISIT (OUTPATIENT)
Dept: PEDIATRIC HEMATOLOGY/ONCOLOGY | Facility: CLINIC | Age: 44
End: 2024-08-28
Attending: PEDIATRICS
Payer: COMMERCIAL

## 2024-08-28 VITALS
RESPIRATION RATE: 20 BRPM | HEART RATE: 77 BPM | SYSTOLIC BLOOD PRESSURE: 110 MMHG | OXYGEN SATURATION: 96 % | DIASTOLIC BLOOD PRESSURE: 66 MMHG | TEMPERATURE: 98.9 F

## 2024-08-28 DIAGNOSIS — Z84.89 FAMILY HISTORY OF GENETIC DISEASE: Primary | ICD-10-CM

## 2024-08-28 DIAGNOSIS — Z15.89 MONOALLELIC MUTATION OF LZTR1 GENE: ICD-10-CM

## 2024-08-28 PROCEDURE — 99214 OFFICE O/P EST MOD 30 MIN: CPT | Performed by: PEDIATRICS

## 2024-08-28 PROCEDURE — 99202 OFFICE O/P NEW SF 15 MIN: CPT | Performed by: PEDIATRICS

## 2024-08-28 ASSESSMENT — PAIN SCALES - GENERAL: PAINLEVEL: NO PAIN (0)

## 2024-08-28 ASSESSMENT — PATIENT HEALTH QUESTIONNAIRE - PHQ9: SUM OF ALL RESPONSES TO PHQ QUESTIONS 1-9: 0

## 2024-08-28 NOTE — NURSING NOTE
"[facility] [[id number]]  Chief Complaint   Patient presents with    Consult     LZTR1 mutation evaluation     Initial /66 (BP Location: Right arm, Patient Position: Sitting, Cuff Size: Adult Regular)   Pulse 77   Temp 98.9  F (37.2  C) (Oral)   Resp 20   SpO2 96%  Estimated body mass index is 18.86 kg/m  as calculated from the following:    Height as of 1/22/20: 5' 7.17\" (170.6 cm).    Weight as of 6/28/22: 121 lb (54.9 kg).  Medication Reconciliation: complete    Does the patient need any medication refills today? No    Does the patient/parent need MyChart or Proxy acces today? No    Luiz Mejía, EMT            "

## 2024-08-28 NOTE — LETTER
8/28/2024      RE: Lindsay Carranza  [address]  [facility] [address]     Dear Colleague,    Thank you for the opportunity to participate in the care of your patient, Lindsay Carranza, at the [facility]. Please see a copy of my visit note below.    HPI  Lindsay Carranza is a 44 year old with a history of a LZTR1 mutation and a cavernous hemangioma who presents to the clinic following a visit with Brittnee Hernández MS, [facility] on 02/05/2024.     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 She initially pursued genetic testing for herself after her mother and two sisters both had cancer and Lindsay had a family history of cancer in both her mother's and father's side of the family.     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 She then had a craniotomy conducted. As a result, she has sensory deficits, nerve pain in her left arm, and left side ataxia. She attends physical therapy and speech therapy and exercises regularly.     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 She does not have children. No brain tumors or Mercedes's syndrome in any known blood relatives.     Current Outpatient Medications   Medication Sig Dispense Refill     cannabidiol (EPIDIOLEX) 100 MG/ML oral solution Take by mouth two times daily.       cetirizine (ZYRTEC) 10 MG tablet Take 10 mg by mouth as needed       citalopram (CELEXA) 10 MG tablet Take 1 tablet (10 mg) by mouth daily 90 tablet 4     clonazePAM (KLONOPIN) 0.5 MG tablet Take 0.5 tablets (0.25 mg) by mouth 2 times daily as needed for anxiety 15 tablet 1     HEMP OIL OR EXTRACT OR OTHER CBD CANNABINOID, NOT MEDICAL CANNABIS,        levothyroxine (SYNTHROID/LEVOTHROID) 100 MCG tablet Take 86 mcg by mouth daily Take 100mcg six of every seven days. This averages to 86mcg daily.         memantine XR (NAMENDA XR) 7 MG 24 hr capsule Take 7 mg by mouth daily       No current facility-administered medications for this visit.     /66 (BP Location: Right arm, Patient Position: Sitting, Cuff Size: Adult Regular)   Pulse 77   Temp 98.9  F (37.2  C) (Oral)   Resp 20   SpO2 96%     Impression:  History of Cavernoma - brain stem  LZTR1 mutation      Plan:  No follow-up needed.   The frequency of LoF LZTR1 variants in the general population (0.36%) was extremely significantly higher than the frequency of LoF SMARCB1 variants (another cause of schwannomatosis) in the general population (0.0025%) (p < 0.0001). Ms. Carranza has no family history of schwannomas or schwannomatosis. Likewise, there is no family or personal history of Elmira syndrome. Likewise, there is no association with cavernoma.     F/U not necessary. Lindsay is welcome to contact us should new concerns arise.     Total time spent on the following services on the date of the encounter:  Preparing to see patient, chart review, review of outside records, Referring or communicating with other healthcare professionals, Interpretation of labs, imaging and other tests, Performing a medically appropriate examination , Documenting clinical information in the electronic or other health record  and Total time spent: 25 minutes    IMagdalena, am working as a scribe for and in the presence of Dr. Kevin on August 28, 2024. Dr. Kevin performed the services described in this note and the note is both complete and accurate.     Haile Kevin MD      Please do not hesitate to contact me if you have any questions/concerns.     Sincerely,       Haile Kevin MD

## 2024-08-28 NOTE — PATIENT INSTRUCTIONS
No Follow Up Needed     Thank you for choosing [facility].    It was a pleasure to see you today.            Neurofibromatosis Team  Haile Kevin MD - Director, Neurofibromatosis/Pediatric Neuro-Oncology  Jessenia Malloy MD - Pediatric Genetics    Ava La, CNP, APRN  Jennifer Flores RN - NF Care Coordinator  Phone: [phone]  E-mail: [email or web address], 9th Floor - [place] [id number] [place] [address]  Scheduling/Appointments: [phone]  Fax: [phone]     Numbers to call:   Monday - Friday, 8:00 am - 5:00 pm:  RN phone/voicemail: [phone]  Scheduling/Appointments: [phone]  Nights and weekends:   Call [phone] and ask the  to page the 'Pediatric Heme/Onc fellow on call' if you have an urgent concern that can't wait until the clinic opens.     Scheduling:    [facility], 9th floor [phone]  Infusion Center/Lab: [phone]   Radiology/ [phone]      Services:   [phone]         We encourage you to sign up for [employer] for easy communication with us.  Sign up at the clinic  or go to [email or web address].      Please try the Pass[employer] to [facility] ([facility]) phone application for Virtual Tours, Procedure Preparation, Resources, Preparation for Hospital Stay and the Coloring Board.     Other Instructions:  Ophthalmology (eye MD) exam every year  Annual physical with your Primary Care Provider  Influenza vaccine every year in the fall  Use Broad-Spectrum sunscreen SPF 15-30 from April through September  Full skin exam by dermatologist every 1-2 years.

## 2024-08-28 NOTE — PROGRESS NOTES
HPI  Lindsay Carranza is a 44 year old with a history of a LZTR1 mutation and a cavernous hemangioma who presents to the clinic following a visit with Brittnee Hernández MS, [facility] [place] on 02/05/2024.     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 She initially pursued genetic testing for herself after her mother and two sisters both had cancer and Lindsay had a family history of cancer in both her mother's and father's side of the family.     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 She then had a craniotomy conducted. As a result, she has sensory deficits, nerve pain in her left arm, and left side ataxia. She attends physical therapy and speech therapy and exercises regularly.     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 She does not have children. No brain tumors or Mercedes's syndrome in any known blood relatives.     Current Outpatient Medications   Medication Sig Dispense Refill    cannabidiol (EPIDIOLEX) 100 MG/ML oral solution Take by mouth two times daily.      cetirizine (ZYRTEC) 10 MG tablet Take 10 mg by mouth as needed      citalopram (CELEXA) 10 MG tablet Take 1 tablet (10 mg) by mouth daily 90 tablet 4    clonazePAM (KLONOPIN) 0.5 MG tablet Take 0.5 tablets (0.25 mg) by mouth 2 times daily as needed for anxiety 15 tablet 1    HEMP OIL OR EXTRACT OR OTHER CBD CANNABINOID, NOT MEDICAL CANNABIS,       levothyroxine (SYNTHROID/LEVOTHROID) 100 MCG tablet Take 86 mcg by mouth daily Take 100mcg six of every seven days. This averages to 86mcg daily.       memantine XR (NAMENDA XR) 7 MG 24 hr capsule Take 7 mg by mouth daily       No current facility-administered medications for this visit.     /66 (BP Location: Right arm, Patient Position: Sitting, Cuff Size: Adult Regular)   Pulse 77   Temp 98.9  F (37.2  C) (Oral)   Resp 20   SpO2 96%     Impression:  History of Cavernoma - brain stem  LZTR1 mutation       Plan:  No follow-up needed.   The frequency of LoF LZTR1 variants in the general population (0.36%) was extremely significantly higher than the frequency of LoF SMARCB1 variants (another cause of schwannomatosis) in the general population (0.0025%) (p < 0.0001). Ms. Carranza has no family history of schwannomas or schwannomatosis. Likewise, there is no family or personal history of Wright syndrome. Likewise, there is no association with cavernoma.     F/U not necessary. Lindsay is welcome to contact us should new concerns arise.     Total time spent on the following services on the date of the encounter:  Preparing to see patient, chart review, review of outside records, Referring or communicating with other healthcare professionals, Interpretation of labs, imaging and other tests, Performing a medically appropriate examination , Documenting clinical information in the electronic or other health record  and Total time spent: 25 minutes    Magdalena BEAL, am working as a scribe for and in the presence of Dr. Kevin on August 28, 2024. Dr. Kevin performed the services described in this note and the note is both complete and accurate.     Haile Kevin MD

## 2024-09-22 ENCOUNTER — HEALTH MAINTENANCE LETTER (OUTPATIENT)
Age: 44
End: 2024-09-22

## 2024-11-09 DIAGNOSIS — F41.9 ANXIETY: ICD-10-CM

## 2024-11-11 RX ORDER — CITALOPRAM HYDROBROMIDE 10 MG/1
10 TABLET ORAL DAILY
Qty: 90 TABLET | Refills: 4 | Status: SHIPPED | OUTPATIENT
Start: 2024-11-11 | End: 2024-11-12

## 2024-11-11 NOTE — TELEPHONE ENCOUNTER
Medication requested: citalopram (CELEXA) 10 MG tablet   Last office visit: 10/25/2023 virtual  Future [facility] appointments: none  Medication last refilled: 10/25/2023; 90 tabs + 4 refills  Last qualifying labs:      8/28/2024  1:54 PM   PHQ-9 / BRIANNE-7 Scores 8/2015 to present    PHQ-9 Score DocFlow 0      Routing refill request to provider for review/approval because:  Pt has not been seen in-person  since 6/28/2022. She has had 3 virtual visits.     MADDY BeN, RN  11/11/24, 1:52 PM

## 2024-11-11 NOTE — TELEPHONE ENCOUNTER
Med refilled as noted.     Lindsay was seen today for medication refill.    Diagnoses and all orders for this visit:    Anxiety  -     citalopram (CELEXA) 10 MG tablet; TAKE 1 TABLET(10 MG) BY MOUTH DAILY      John Vela MD  5:48 PM, November 11, 2024

## 2024-12-10 DIAGNOSIS — F41.9 ANXIETY: ICD-10-CM

## 2024-12-11 RX ORDER — CITALOPRAM HYDROBROMIDE 10 MG/1
10 TABLET ORAL DAILY
Qty: 30 TABLET | Refills: 0 | Status: SHIPPED | OUTPATIENT
Start: 2024-12-11

## 2024-12-11 NOTE — TELEPHONE ENCOUNTER
Medication requested: citalopram (CELEXA) 10 MG tablet   Last office visit: 6/28/22  [facility] appointments: 12/20/24  Medication last refilled: 11/12/24; 30 + 0 refills  Last qualifying labs:    10/25/2023  10:38 AM   PHQ-9 / BRIANNE-7 Scores 8/2015 to present    BRIANNE-7 Score DocFlow 1       10/25/2023  10:32 AM   PHQ-9 / BRIANNE-7 Scores 8/2015 to present    PHQ-9 Score DocFlow 5      Medication is being filled for 1 time refill only due to:  Patient needs to be seen because it has been more than one year since last visit.    Wero ESPINAL, RN  12/11/24 10:07 AM